# Patient Record
Sex: FEMALE | Race: WHITE | NOT HISPANIC OR LATINO
[De-identification: names, ages, dates, MRNs, and addresses within clinical notes are randomized per-mention and may not be internally consistent; named-entity substitution may affect disease eponyms.]

---

## 2017-01-05 ENCOUNTER — MEDICATION RENEWAL (OUTPATIENT)
Age: 72
End: 2017-01-05

## 2017-01-13 ENCOUNTER — APPOINTMENT (OUTPATIENT)
Dept: DERMATOLOGY | Facility: CLINIC | Age: 72
End: 2017-01-13

## 2017-01-26 ENCOUNTER — APPOINTMENT (OUTPATIENT)
Dept: DERMATOLOGY | Facility: CLINIC | Age: 72
End: 2017-01-26

## 2017-02-06 ENCOUNTER — LABORATORY RESULT (OUTPATIENT)
Age: 72
End: 2017-02-06

## 2017-02-10 LAB
ALBUMIN SERPL ELPH-MCNC: 4.1 G/DL
ALP BLD-CCNC: 109 U/L
ALT SERPL-CCNC: 27 U/L
ANION GAP SERPL CALC-SCNC: 16 MMOL/L
AST SERPL-CCNC: 24 U/L
BASOPHILS # BLD AUTO: 0.03 K/UL
BASOPHILS NFR BLD AUTO: 0.8 %
BILIRUB SERPL-MCNC: 0.7 MG/DL
BUN SERPL-MCNC: 17 MG/DL
CALCIUM SERPL-MCNC: 10.4 MG/DL
CHLORIDE SERPL-SCNC: 102 MMOL/L
CO2 SERPL-SCNC: 24 MMOL/L
CREAT SERPL-MCNC: 0.79 MG/DL
EOSINOPHIL # BLD AUTO: 0.25 K/UL
EOSINOPHIL NFR BLD AUTO: 6.3 %
GLUCOSE SERPL-MCNC: 108 MG/DL
HCT VFR BLD CALC: 46.5 %
HGB BLD-MCNC: 14.6 G/DL
IMM GRANULOCYTES NFR BLD AUTO: 0 %
LYMPHOCYTES # BLD AUTO: 1.26 K/UL
LYMPHOCYTES NFR BLD AUTO: 31.6 %
MAN DIFF?: NORMAL
MCHC RBC-ENTMCNC: 31.4 GM/DL
MCHC RBC-ENTMCNC: 33 PG
MCV RBC AUTO: 105 FL
MONOCYTES # BLD AUTO: 0.41 K/UL
MONOCYTES NFR BLD AUTO: 10.3 %
NEUTROPHILS # BLD AUTO: 2.04 K/UL
NEUTROPHILS NFR BLD AUTO: 51 %
PLATELET # BLD AUTO: 242 K/UL
POTASSIUM SERPL-SCNC: 4.4 MMOL/L
PROT SERPL-MCNC: 7.4 G/DL
RBC # BLD: 4.43 M/UL
RBC # FLD: 13.1 %
SODIUM SERPL-SCNC: 142 MMOL/L
WBC # FLD AUTO: 3.99 K/UL

## 2017-03-09 ENCOUNTER — APPOINTMENT (OUTPATIENT)
Dept: DERMATOLOGY | Facility: CLINIC | Age: 72
End: 2017-03-09

## 2017-03-09 VITALS
HEIGHT: 64 IN | DIASTOLIC BLOOD PRESSURE: 68 MMHG | WEIGHT: 164 LBS | BODY MASS INDEX: 28 KG/M2 | SYSTOLIC BLOOD PRESSURE: 130 MMHG

## 2017-03-21 ENCOUNTER — APPOINTMENT (OUTPATIENT)
Dept: INTERNAL MEDICINE | Facility: CLINIC | Age: 72
End: 2017-03-21

## 2017-03-21 VITALS
SYSTOLIC BLOOD PRESSURE: 124 MMHG | HEIGHT: 64 IN | BODY MASS INDEX: 29.02 KG/M2 | DIASTOLIC BLOOD PRESSURE: 80 MMHG | WEIGHT: 170 LBS

## 2017-06-01 ENCOUNTER — APPOINTMENT (OUTPATIENT)
Dept: DERMATOLOGY | Facility: CLINIC | Age: 72
End: 2017-06-01

## 2017-06-01 VITALS — SYSTOLIC BLOOD PRESSURE: 124 MMHG | DIASTOLIC BLOOD PRESSURE: 80 MMHG

## 2017-06-01 DIAGNOSIS — Z78.9 OTHER SPECIFIED HEALTH STATUS: ICD-10-CM

## 2017-08-16 ENCOUNTER — MEDICATION RENEWAL (OUTPATIENT)
Age: 72
End: 2017-08-16

## 2017-08-28 ENCOUNTER — RESULT CHARGE (OUTPATIENT)
Age: 72
End: 2017-08-28

## 2017-08-29 ENCOUNTER — APPOINTMENT (OUTPATIENT)
Dept: INTERNAL MEDICINE | Facility: CLINIC | Age: 72
End: 2017-08-29
Payer: MEDICARE

## 2017-08-29 ENCOUNTER — NON-APPOINTMENT (OUTPATIENT)
Age: 72
End: 2017-08-29

## 2017-08-29 VITALS
BODY MASS INDEX: 26.98 KG/M2 | SYSTOLIC BLOOD PRESSURE: 130 MMHG | HEIGHT: 64 IN | OXYGEN SATURATION: 98 % | DIASTOLIC BLOOD PRESSURE: 70 MMHG | HEART RATE: 72 BPM | WEIGHT: 158 LBS

## 2017-08-29 PROCEDURE — 99397 PER PM REEVAL EST PAT 65+ YR: CPT

## 2017-08-31 LAB
25(OH)D3 SERPL-MCNC: 46.3 NG/ML
ALBUMIN SERPL ELPH-MCNC: 4.4 G/DL
ALP BLD-CCNC: 94 U/L
ALT SERPL-CCNC: 31 U/L
ANION GAP SERPL CALC-SCNC: 17 MMOL/L
AST SERPL-CCNC: 45 U/L
BASOPHILS # BLD AUTO: 0.02 K/UL
BASOPHILS NFR BLD AUTO: 0.4 %
BILIRUB SERPL-MCNC: 0.6 MG/DL
BUN SERPL-MCNC: 21 MG/DL
CALCIUM SERPL-MCNC: 10.7 MG/DL
CHLORIDE SERPL-SCNC: 101 MMOL/L
CHOLEST SERPL-MCNC: 249 MG/DL
CHOLEST/HDLC SERPL: 1.6 RATIO
CO2 SERPL-SCNC: 24 MMOL/L
CREAT SERPL-MCNC: 1.12 MG/DL
EOSINOPHIL # BLD AUTO: 0.14 K/UL
EOSINOPHIL NFR BLD AUTO: 3.1 %
GLUCOSE SERPL-MCNC: 106 MG/DL
HCT VFR BLD CALC: 44.5 %
HDLC SERPL-MCNC: 152 MG/DL
HGB BLD-MCNC: 14.1 G/DL
IMM GRANULOCYTES NFR BLD AUTO: 0.2 %
LDLC SERPL CALC-MCNC: 73 MG/DL
LYMPHOCYTES # BLD AUTO: 0.79 K/UL
LYMPHOCYTES NFR BLD AUTO: 17.5 %
MAN DIFF?: NORMAL
MCHC RBC-ENTMCNC: 31.7 GM/DL
MCHC RBC-ENTMCNC: 32.9 PG
MCV RBC AUTO: 104 FL
MONOCYTES # BLD AUTO: 0.43 K/UL
MONOCYTES NFR BLD AUTO: 9.5 %
NEUTROPHILS # BLD AUTO: 3.12 K/UL
NEUTROPHILS NFR BLD AUTO: 69.3 %
PLATELET # BLD AUTO: 199 K/UL
POTASSIUM SERPL-SCNC: 4.6 MMOL/L
PROT SERPL-MCNC: 7.6 G/DL
RBC # BLD: 4.28 M/UL
RBC # FLD: 13.5 %
SODIUM SERPL-SCNC: 142 MMOL/L
TRIGL SERPL-MCNC: 119 MG/DL
TSH SERPL-ACNC: 0.95 UIU/ML
WBC # FLD AUTO: 4.51 K/UL

## 2017-09-14 ENCOUNTER — RX RENEWAL (OUTPATIENT)
Age: 72
End: 2017-09-14

## 2017-09-14 ENCOUNTER — TRANSCRIPTION ENCOUNTER (OUTPATIENT)
Age: 72
End: 2017-09-14

## 2017-09-18 ENCOUNTER — TRANSCRIPTION ENCOUNTER (OUTPATIENT)
Age: 72
End: 2017-09-18

## 2017-12-28 ENCOUNTER — APPOINTMENT (OUTPATIENT)
Dept: DERMATOLOGY | Facility: CLINIC | Age: 72
End: 2017-12-28
Payer: MEDICARE

## 2017-12-28 VITALS — SYSTOLIC BLOOD PRESSURE: 122 MMHG | DIASTOLIC BLOOD PRESSURE: 80 MMHG

## 2017-12-28 PROCEDURE — 99214 OFFICE O/P EST MOD 30 MIN: CPT | Mod: 25

## 2017-12-28 PROCEDURE — 17003 DESTRUCT PREMALG LES 2-14: CPT

## 2017-12-28 PROCEDURE — 17000 DESTRUCT PREMALG LESION: CPT

## 2018-02-22 ENCOUNTER — APPOINTMENT (OUTPATIENT)
Dept: DERMATOLOGY | Facility: CLINIC | Age: 73
End: 2018-02-22
Payer: MEDICARE

## 2018-02-22 PROCEDURE — 17000 DESTRUCT PREMALG LESION: CPT

## 2018-02-22 PROCEDURE — 99213 OFFICE O/P EST LOW 20 MIN: CPT | Mod: 25

## 2018-05-03 ENCOUNTER — APPOINTMENT (OUTPATIENT)
Dept: DERMATOLOGY | Facility: CLINIC | Age: 73
End: 2018-05-03
Payer: MEDICARE

## 2018-05-03 VITALS — DIASTOLIC BLOOD PRESSURE: 82 MMHG | SYSTOLIC BLOOD PRESSURE: 128 MMHG

## 2018-05-03 PROCEDURE — 99213 OFFICE O/P EST LOW 20 MIN: CPT

## 2018-05-14 ENCOUNTER — RX RENEWAL (OUTPATIENT)
Age: 73
End: 2018-05-14

## 2018-08-09 ENCOUNTER — APPOINTMENT (OUTPATIENT)
Dept: DERMATOLOGY | Facility: CLINIC | Age: 73
End: 2018-08-09
Payer: MEDICARE

## 2018-08-09 VITALS — SYSTOLIC BLOOD PRESSURE: 128 MMHG | DIASTOLIC BLOOD PRESSURE: 80 MMHG

## 2018-08-09 DIAGNOSIS — R22.9 LOCALIZED SWELLING, MASS AND LUMP, UNSPECIFIED: ICD-10-CM

## 2018-08-09 PROCEDURE — 17003 DESTRUCT PREMALG LES 2-14: CPT

## 2018-08-09 PROCEDURE — 99213 OFFICE O/P EST LOW 20 MIN: CPT | Mod: 25

## 2018-08-09 PROCEDURE — 17000 DESTRUCT PREMALG LESION: CPT

## 2018-09-06 ENCOUNTER — APPOINTMENT (OUTPATIENT)
Dept: INTERNAL MEDICINE | Facility: CLINIC | Age: 73
End: 2018-09-06

## 2018-10-11 ENCOUNTER — APPOINTMENT (OUTPATIENT)
Dept: DERMATOLOGY | Facility: CLINIC | Age: 73
End: 2018-10-11
Payer: MEDICARE

## 2018-10-11 VITALS — DIASTOLIC BLOOD PRESSURE: 74 MMHG | SYSTOLIC BLOOD PRESSURE: 124 MMHG

## 2018-10-11 PROCEDURE — 99214 OFFICE O/P EST MOD 30 MIN: CPT

## 2018-10-18 ENCOUNTER — APPOINTMENT (OUTPATIENT)
Dept: INTERNAL MEDICINE | Facility: CLINIC | Age: 73
End: 2018-10-18

## 2018-11-12 ENCOUNTER — RX RENEWAL (OUTPATIENT)
Age: 73
End: 2018-11-12

## 2018-11-19 ENCOUNTER — APPOINTMENT (OUTPATIENT)
Dept: INTERNAL MEDICINE | Facility: CLINIC | Age: 73
End: 2018-11-19
Payer: MEDICARE

## 2018-11-19 ENCOUNTER — NON-APPOINTMENT (OUTPATIENT)
Age: 73
End: 2018-11-19

## 2018-11-19 VITALS
OXYGEN SATURATION: 95 % | HEART RATE: 73 BPM | DIASTOLIC BLOOD PRESSURE: 80 MMHG | SYSTOLIC BLOOD PRESSURE: 120 MMHG | HEIGHT: 63 IN | BODY MASS INDEX: 29.23 KG/M2 | WEIGHT: 165 LBS

## 2018-11-19 DIAGNOSIS — R94.31 ABNORMAL ELECTROCARDIOGRAM [ECG] [EKG]: ICD-10-CM

## 2018-11-19 LAB
ALBUMIN SERPL ELPH-MCNC: 4.5 G/DL
ALP BLD-CCNC: 88 U/L
ALT SERPL-CCNC: 15 U/L
ANION GAP SERPL CALC-SCNC: 12 MMOL/L
AST SERPL-CCNC: 22 U/L
BASOPHILS # BLD AUTO: 0.03 K/UL
BASOPHILS NFR BLD AUTO: 0.6 %
BILIRUB SERPL-MCNC: 0.4 MG/DL
BUN SERPL-MCNC: 24 MG/DL
CALCIUM SERPL-MCNC: 10.8 MG/DL
CHLORIDE SERPL-SCNC: 105 MMOL/L
CHOLEST SERPL-MCNC: 198 MG/DL
CHOLEST/HDLC SERPL: 1.9 RATIO
CO2 SERPL-SCNC: 25 MMOL/L
CREAT SERPL-MCNC: 0.98 MG/DL
EOSINOPHIL # BLD AUTO: 0.15 K/UL
EOSINOPHIL NFR BLD AUTO: 3 %
GLUCOSE SERPL-MCNC: 107 MG/DL
HBA1C MFR BLD HPLC: 5.4 %
HCT VFR BLD CALC: 46.1 %
HCV AB SER QL: NONREACTIVE
HCV S/CO RATIO: 0.07 S/CO
HDLC SERPL-MCNC: 107 MG/DL
HGB BLD-MCNC: 15 G/DL
IMM GRANULOCYTES NFR BLD AUTO: 0.2 %
LDLC SERPL CALC-MCNC: 66 MG/DL
LYMPHOCYTES # BLD AUTO: 1.4 K/UL
LYMPHOCYTES NFR BLD AUTO: 28.2 %
MAN DIFF?: NORMAL
MCHC RBC-ENTMCNC: 32.5 GM/DL
MCHC RBC-ENTMCNC: 33.8 PG
MCV RBC AUTO: 103.8 FL
MONOCYTES # BLD AUTO: 0.48 K/UL
MONOCYTES NFR BLD AUTO: 9.7 %
NEUTROPHILS # BLD AUTO: 2.89 K/UL
NEUTROPHILS NFR BLD AUTO: 58.3 %
PLATELET # BLD AUTO: 219 K/UL
POTASSIUM SERPL-SCNC: 4.7 MMOL/L
PROT SERPL-MCNC: 7.5 G/DL
RBC # BLD: 4.44 M/UL
RBC # FLD: 13 %
SODIUM SERPL-SCNC: 142 MMOL/L
TRIGL SERPL-MCNC: 125 MG/DL
WBC # FLD AUTO: 4.96 K/UL

## 2018-11-19 PROCEDURE — 90472 IMMUNIZATION ADMIN EACH ADD: CPT

## 2018-11-19 PROCEDURE — G0008: CPT

## 2018-11-19 PROCEDURE — 93000 ELECTROCARDIOGRAM COMPLETE: CPT

## 2018-11-19 PROCEDURE — G0439: CPT | Mod: 25

## 2018-11-19 PROCEDURE — 90715 TDAP VACCINE 7 YRS/> IM: CPT

## 2018-11-19 PROCEDURE — 90662 IIV NO PRSV INCREASED AG IM: CPT

## 2018-11-19 NOTE — HISTORY OF PRESENT ILLNESS
[FreeTextEntry1] : Here for annual wellness visit [de-identified] : c/o insomnia: difficulty falling asleep AND staying asleep.  Hasn't slept well for many years but the last 2-3 months has gotten sig worse. \par Does not nap during the day nor fall asleep.  Does sometimes snore but never wakes up air hungry nor does she fall asleep during the day. Is currently using melatonin - started with 3 mg melatonin however didn’t help much.  now on 6 mg - falls asleep more easily but still wakes up Q 2 hours.  Often up for an hour before she can fall back asleep. Has only been on this higher dose a few days\par

## 2018-11-19 NOTE — PHYSICAL EXAM
[No Acute Distress] : no acute distress [Well-Appearing] : well-appearing [Normal Sclera/Conjunctiva] : normal sclera/conjunctiva [PERRL] : pupils equal round and reactive to light [EOMI] : extraocular movements intact [Normal Outer Ear/Nose] : the outer ears and nose were normal in appearance [Normal Oropharynx] : the oropharynx was normal [No JVD] : no jugular venous distention [No Lymphadenopathy] : no lymphadenopathy [No Respiratory Distress] : no respiratory distress  [Clear to Auscultation] : lungs were clear to auscultation bilaterally [Normal Rate] : normal rate  [Regular Rhythm] : with a regular rhythm [No Murmur] : no murmur heard [Pedal Pulses Present] : the pedal pulses are present [No Edema] : there was no peripheral edema [No Extremity Clubbing/Cyanosis] : no extremity clubbing/cyanosis [No Masses] : no palpable masses [No Nipple Discharge] : no nipple discharge [No Axillary Lymphadenopathy] : no axillary lymphadenopathy [Soft] : abdomen soft [Non Tender] : non-tender [Normal Bowel Sounds] : normal bowel sounds [Normal Supraclavicular Nodes] : no supraclavicular lymphadenopathy [Normal Axillary Nodes] : no axillary lymphadenopathy [Normal Posterior Cervical Nodes] : no posterior cervical lymphadenopathy [Normal Anterior Cervical Nodes] : no anterior cervical lymphadenopathy [No CVA Tenderness] : no CVA  tenderness [No Spinal Tenderness] : no spinal tenderness [Kyphosis] : kyphosis [No Joint Swelling] : no joint swelling [Grossly Normal Strength/Tone] : grossly normal strength/tone [Thin Hair] : thin hair [Normal] : normal texture and mobility [Normal Gait] : normal gait [Coordination Grossly Intact] : coordination grossly intact [Normal Affect] : the affect was normal [Normal Insight/Judgement] : insight and judgment were intact [de-identified] : no skin changes [de-identified] : chronic sun exposure - areas of erythema on face - sites where she is treating AKs [FreeTextEntry2] : ecchymosis approx 4 cm right upper lateral thigh

## 2018-11-19 NOTE — ADDENDUM
[FreeTextEntry1] : lab work from visit reviewed.  Adding iPTH.   Prior labs c/w primary hyperparathyroidism, rechecking.  Know, macrocytosis - onc is monitoring.

## 2018-11-19 NOTE — REVIEW OF SYSTEMS
[Postnasal Drip] : postnasal drip [Mole Changes] : mole changes [Insomnia] : insomnia [Negative] : Heme/Lymph [Depression] : no depression [de-identified] : freq derm visits - treatment for AK's

## 2018-11-19 NOTE — DISCUSSION/SUMMARY
[Subsequent Annual Wellness] : Subsequent Annual Wellness Visit [Improve Exercise Tolerance] : counseling was given on ways to improve exercise tolerance [Lipids Panel] : due for a lipid panel [Lipid Panel Freq.___(yrs)] : lipid panel is due every [unfilled] year(s) [Blood Glucose] : due for blood glucose [Risks and Benefits Discussed] : the risks and benefits of screening were discussed [Screening Not Indicated] : screening not indicated [Patient Declines] : the patient declines screening [Screening Current] : screening is current [Influenza Due Today] : influenza vaccine is due today [Risks & Benefits Discussed] : the risks and benefits of the Tdap vaccine were discussed with the patient [Tdap Vaccine Needed Today] : Tdap vaccine needed today [Complete and Up to Date] : complete and up to date [Endocrinology] : endocrinology [Follow-Up in ___] : follow-up visit needed in [unfilled] [FreeTextEntry4] : see note

## 2018-11-19 NOTE — HEALTH RISK ASSESSMENT
[Very Good] : ~his/her~ current health as very good [Good] : ~his/her~  mood as  good [No falls in past year] : Patient reported no falls in the past year [0] : 2) Feeling down, depressed, or hopeless: Not at all (0) [Patient reported mammogram was normal] : Patient reported mammogram was normal [HIV test declined] : HIV test declined [Hepatitis C test offered] : Hepatitis C test offered [Change in mental status noted] : Change in mental status noted [With Significant Other] : lives with significant other [] :  [Feels Safe at Home] : Feels safe at home [Fully functional (bathing, dressing, toileting, transferring, walking, feeding)] : Fully functional (bathing, dressing, toileting, transferring, walking, feeding) [Fully functional (using the telephone, shopping, preparing meals, housekeeping, doing laundry, using] : Fully functional and needs no help or supervision to perform IADLs (using the telephone, shopping, preparing meals, housekeeping, doing laundry, using transportation, managing medications and managing finances) [Smoke Detector] : smoke detector [Carbon Monoxide Detector] : carbon monoxide detector [Guns at Home] : guns at home [Seat Belt] :  uses seat belt [Sunscreen] : uses sunscreen [Discussed at today's visit] : Advance Directives Discussed at today's visit [Relationship: ___] : Relationship: [unfilled] [FreeTextEntry1] : Doesnt slleep as well as she used to [] : No [de-identified] : Derm - being treated for superficial skin cancer  [de-identified] : rare [de-identified] : tripped and fell in bathroom last week. nop head trauma of LOC. Bruise right upper thigh [Reports changes in hearing] : Reports no changes in hearing [Reports changes in vision] : Reports no changes in vision [MammogramDate] : 7/2018 [BoneDensityDate] : 7/2018 [BoneDensityComments] : results went to her onc - Dr Aretha Bertrand [ColonoscopyDate] : 2015 [ColonoscopyComments] : Dr Bauman, Dr. Yu took over.  [de-identified] : saw isidro last month - all ok [FreeTextEntry4] : Health Care Proxy form given - reviewed role of proxy and relevance including: Health Care Proxy allows you to appoint someone you trust  to make health care decisions for you if you lose the ability to make decisions yourself. By appointing a health care agent, you can ensure your wishes will be followed. You may allow your agent to make all health care decisions or only certain ones. The proxy form can also be used to document your wishes or instructions with regard to organ and/or tissue donation.\par Agree as patients physician to support expressed wishes.

## 2018-12-13 ENCOUNTER — APPOINTMENT (OUTPATIENT)
Dept: DERMATOLOGY | Facility: CLINIC | Age: 73
End: 2018-12-13
Payer: MEDICARE

## 2018-12-13 VITALS — DIASTOLIC BLOOD PRESSURE: 70 MMHG | SYSTOLIC BLOOD PRESSURE: 130 MMHG

## 2018-12-13 PROCEDURE — 99213 OFFICE O/P EST LOW 20 MIN: CPT

## 2019-03-06 ENCOUNTER — RX RENEWAL (OUTPATIENT)
Age: 74
End: 2019-03-06

## 2019-03-19 ENCOUNTER — APPOINTMENT (OUTPATIENT)
Dept: INTERNAL MEDICINE | Facility: CLINIC | Age: 74
End: 2019-03-19
Payer: MEDICARE

## 2019-03-19 VITALS
HEIGHT: 63 IN | SYSTOLIC BLOOD PRESSURE: 128 MMHG | HEART RATE: 76 BPM | OXYGEN SATURATION: 96 % | WEIGHT: 164 LBS | BODY MASS INDEX: 29.06 KG/M2 | DIASTOLIC BLOOD PRESSURE: 70 MMHG

## 2019-03-19 DIAGNOSIS — J30.9 ALLERGIC RHINITIS, UNSPECIFIED: ICD-10-CM

## 2019-03-19 PROCEDURE — 99214 OFFICE O/P EST MOD 30 MIN: CPT

## 2019-03-19 NOTE — REVIEW OF SYSTEMS
[Nasal Discharge] : nasal discharge [Mole Changes] : mole changes [Postnasal Drip] : postnasal drip [Insomnia] : insomnia [Negative] : Psychiatric [Hoarseness] : no hoarseness [Chest Pain] : no chest pain [Palpitations] : no palpitations [Lower Ext Edema] : no lower extremity edema [Depression] : no depression [FreeTextEntry4] : chronic allergic rhinitis [de-identified] : multiple derm visits - treatment for AK's.  currently having lesion on nose treated

## 2019-03-19 NOTE — HISTORY OF PRESENT ILLNESS
[Hyperlipidemia] : Hyperlipidemia [Hypertension] : Hypertension [Other: ___] : [unfilled]: [Does not check BP] : The patient is not checking blood pressure [<130/90] : Target blood pressure is  <130/90 [Doing Well] : Patient is doing well [Target goal met] : BP target goal met [Managed with medications] : managed with  medication [Low Intensity Therapy] : Patient is currently on low intensity statin  therapy [FreeTextEntry6] : derm visit, precancerous lesion on nose [FreeTextEntry1] : hypercalcemia - tries to remember to drink enough water.  Remembers most days. Usually 33 oz bottle of water daily.  used to drink twice that but then was up all night in the bathroom.  no sx of elevated calcium

## 2019-03-19 NOTE — PHYSICAL EXAM
[No Acute Distress] : no acute distress [Well-Appearing] : well-appearing [Normal Sclera/Conjunctiva] : normal sclera/conjunctiva [PERRL] : pupils equal round and reactive to light [EOMI] : extraocular movements intact [Normal Outer Ear/Nose] : the outer ears and nose were normal in appearance [No JVD] : no jugular venous distention [No Lymphadenopathy] : no lymphadenopathy [No Respiratory Distress] : no respiratory distress  [Clear to Auscultation] : lungs were clear to auscultation bilaterally [Normal Rate] : normal rate  [Regular Rhythm] : with a regular rhythm [No Murmur] : no murmur heard [Pedal Pulses Present] : the pedal pulses are present [No Edema] : there was no peripheral edema [No Extremity Clubbing/Cyanosis] : no extremity clubbing/cyanosis [Soft] : abdomen soft [Non Tender] : non-tender [Normal Bowel Sounds] : normal bowel sounds [Normal Anterior Cervical Nodes] : no anterior cervical lymphadenopathy [No CVA Tenderness] : no CVA  tenderness [No Spinal Tenderness] : no spinal tenderness [Kyphosis] : kyphosis [No Joint Swelling] : no joint swelling [Grossly Normal Strength/Tone] : grossly normal strength/tone [No Rash] : no rash [Thin Hair] : thin hair [Normal] : normal texture and mobility [Coordination Grossly Intact] : coordination grossly intact [No Focal Deficits] : no focal deficits [de-identified] : cobblestoning

## 2019-03-20 ENCOUNTER — TRANSCRIPTION ENCOUNTER (OUTPATIENT)
Age: 74
End: 2019-03-20

## 2019-03-20 LAB
BASOPHILS # BLD AUTO: 0.04 K/UL
BASOPHILS NFR BLD AUTO: 0.8 %
EOSINOPHIL # BLD AUTO: 0.27 K/UL
EOSINOPHIL NFR BLD AUTO: 5.6 %
HCT VFR BLD CALC: 46.7 %
HGB BLD-MCNC: 14.5 G/DL
IMM GRANULOCYTES NFR BLD AUTO: 0 %
LYMPHOCYTES # BLD AUTO: 1.36 K/UL
LYMPHOCYTES NFR BLD AUTO: 28.1 %
MAN DIFF?: NORMAL
MCHC RBC-ENTMCNC: 31 GM/DL
MCHC RBC-ENTMCNC: 33.1 PG
MCV RBC AUTO: 106.6 FL
MONOCYTES # BLD AUTO: 0.41 K/UL
MONOCYTES NFR BLD AUTO: 8.5 %
NEUTROPHILS # BLD AUTO: 2.76 K/UL
NEUTROPHILS NFR BLD AUTO: 57 %
PLATELET # BLD AUTO: 237 K/UL
RBC # BLD: 4.38 M/UL
RBC # FLD: 12.9 %
WBC # FLD AUTO: 4.84 K/UL

## 2019-03-29 ENCOUNTER — LABORATORY RESULT (OUTPATIENT)
Age: 74
End: 2019-03-29

## 2019-04-15 LAB
ANION GAP SERPL CALC-SCNC: 13 MMOL/L
BUN SERPL-MCNC: 27 MG/DL
CALCIUM SERPL-MCNC: 10 MG/DL
CHLORIDE SERPL-SCNC: 104 MMOL/L
CO2 SERPL-SCNC: 27 MMOL/L
CREAT SERPL-MCNC: 0.93 MG/DL
GLUCOSE SERPL-MCNC: 88 MG/DL
POTASSIUM SERPL-SCNC: 5 MMOL/L
SODIUM SERPL-SCNC: 144 MMOL/L

## 2019-05-23 ENCOUNTER — APPOINTMENT (OUTPATIENT)
Dept: INTERNAL MEDICINE | Facility: CLINIC | Age: 74
End: 2019-05-23
Payer: MEDICARE

## 2019-05-23 VITALS
HEART RATE: 91 BPM | WEIGHT: 165 LBS | HEIGHT: 63 IN | SYSTOLIC BLOOD PRESSURE: 122 MMHG | DIASTOLIC BLOOD PRESSURE: 70 MMHG | BODY MASS INDEX: 29.23 KG/M2 | OXYGEN SATURATION: 97 %

## 2019-05-23 DIAGNOSIS — N20.0 CALCULUS OF KIDNEY: ICD-10-CM

## 2019-05-23 PROCEDURE — 99215 OFFICE O/P EST HI 40 MIN: CPT

## 2019-05-23 RX ORDER — AMOXICILLIN 500 MG/1
500 CAPSULE ORAL
Qty: 16 | Refills: 0 | Status: DISCONTINUED | COMMUNITY
Start: 2017-07-01 | End: 2019-05-23

## 2019-05-23 RX ORDER — FLUOROURACIL 50 MG/G
5 CREAM TOPICAL
Qty: 1 | Refills: 0 | Status: DISCONTINUED | COMMUNITY
Start: 2017-12-28 | End: 2019-05-23

## 2019-05-28 NOTE — PHYSICAL EXAM
[No Acute Distress] : no acute distress [Well-Appearing] : well-appearing [Normal Sclera/Conjunctiva] : normal sclera/conjunctiva [PERRL] : pupils equal round and reactive to light [EOMI] : extraocular movements intact [Normal Oropharynx] : the oropharynx was normal [Supple] : supple [No Lymphadenopathy] : no lymphadenopathy [No Respiratory Distress] : no respiratory distress  [Clear to Auscultation] : lungs were clear to auscultation bilaterally [No Accessory Muscle Use] : no accessory muscle use [Normal Rate] : normal rate  [Regular Rhythm] : with a regular rhythm [Normal S1, S2] : normal S1 and S2 [No Murmur] : no murmur heard [No Edema] : there was no peripheral edema [Soft] : abdomen soft [Non Tender] : non-tender [Normal Bowel Sounds] : normal bowel sounds [Normal Supraclavicular Nodes] : no supraclavicular lymphadenopathy [Normal Posterior Cervical Nodes] : no posterior cervical lymphadenopathy [Normal Anterior Cervical Nodes] : no anterior cervical lymphadenopathy [No CVA Tenderness] : no CVA  tenderness [No Focal Deficits] : no focal deficits [Speech Grossly Normal] : speech grossly normal [Normal Affect] : the affect was normal [Alert and Oriented x3] : oriented to person, place, and time [Normal Mood] : the mood was normal [Normal Insight/Judgement] : insight and judgment were intact [de-identified] : normal turgor [de-identified] : No calf tenderness bilaterally. Radial pulses +2 bilaterally

## 2019-05-28 NOTE — ASSESSMENT
[High Risk Surgery - Intraperitoneal, Intrathoracic or Supringuinal Vascular Procedures] : High Risk Surgery - Intraperitoneal, Intrathoracic or Supringuinal Vascular Procedures - No (0) [Ischemic Heart Disease] : Ischemic Heart Disease - No (0) [Congestive Heart Failure] : Congestive Heart Failure - No (0) [Prior Cerebrovascular Accident or TIA] : Prior Cerebrovascular Accident or TIA - No (0) [Creatinine >= 2mg/dL (1 Point)] : Creatinine >= 2mg/dL - No (0) [Insulin-dependent Diabetic (1 Point)] : Insulin-dependent Diabetic - No (0) [0] : 0 , RCRI Class: I, Risk of Post-Op Cardiac Complications: 0.4%, Procedure Risk: Low-Risk [No Further Testing Recommended] : no further testing recommended [Modify medications prior to procedure] : Modify medications prior to procedure [As per surgery] : as per surgery [Patient Optimized for Surgery] : Patient optimized for surgery [FreeTextEntry4] : patient is a low risk candidate going for an intermediate risk procedure and nobody is zero risk and nothing is zero risk and this was discussed with the pt who expressed full understanding [FreeTextEntry7] : STOP Aleve, only tylenol prn pain, stop multivitamins/herbals 7 days prior to procedure all advised to the pt

## 2019-05-28 NOTE — PLAN
[FreeTextEntry1] : advised pt to f/u with the surgeon post -op and with us PRN and to do routine follouwp as planned with her PMD in July\par \par

## 2019-05-28 NOTE — RESULTS/DATA
[ECG Reviewed] : reviewed [No Acute Ischemia] : No acute ischemia [NSR] : normal sinus rhythm [P Waves Normal] : the P wave is normal [Normal QRS] : the QRS is normal [Normal ST Segments] : the ST segments are normal [No Interval Change] : no interval change [] : results reviewed [de-identified] : PST labs on 5/23/19 - CBC is unremarkable [de-identified] : PST labs on 5/23/19 showing Ca 11.0 (pt has known hypercalcemia which has ranged from 10.0 - 11.6) [de-identified] : PST Labs on 5/23/19 - coags are WNL [de-identified] : Labs from PST done on 5/23/19 also showed UA with blood and WBCs consistent with her known kidney stone

## 2019-05-28 NOTE — REVIEW OF SYSTEMS
[Fever] : no fever [Chills] : no chills [Pain] : no pain [Sore Throat] : no sore throat [Redness] : no redness [Chest Pain] : no chest pain [Leg Claudication] : no leg claudication [Palpitations] : no palpitations [Orthopnea] : no orthopnea [Lower Ext Edema] : no lower extremity edema [Shortness Of Breath] : no shortness of breath [Paroysmal Nocturnal Dyspnea] : no paroysmal nocturnal dyspnea [Cough] : no cough [Dyspnea on Exertion] : no dyspnea on exertion [Diarrhea] : diarrhea [Abdominal Pain] : no abdominal pain [Joint Swelling] : no joint swelling [Dysuria] : no dysuria [Hematuria] : no hematuria [Dizziness] : no dizziness [Skin Rash] : no skin rash [Fainting] : no fainting [Easy Bleeding] : no easy bleeding [Easy Bruising] : no easy bruising [de-identified] : pt denied a hx of easy bleeding or easy bruising in the past

## 2019-05-28 NOTE — HISTORY OF PRESENT ILLNESS
[(Patient denies any chest pain, claudication, dyspnea on exertion, orthopnea, palpitations or syncope)] : Patient denies any chest pain, claudication, dyspnea on exertion, orthopnea, palpitations or syncope [Moderate (4-6 METs)] : Moderate (4-6 METs) [Atrial Fibrillation] : no atrial fibrillation [Coronary Artery Disease] : no coronary artery disease [Aortic Stenosis] : no aortic stenosis [Recent Myocardial Infarction] : no recent myocardial infarction [Implantable Device/Pacemaker] : no implantable device/pacemaker [Asthma] : no asthma [Sleep Apnea] : no sleep apnea [COPD] : no COPD [Family Member] : no family member with adverse anesthesia reaction/sudden death [Self] : no previous adverse anesthesia reaction [Smoker] : not a smoker [Chronic Anticoagulation] : no chronic anticoagulation [Chronic Kidney Disease] : no chronic kidney disease [Diabetes] : no diabetes [FreeTextEntry1] : cystoscopy [FreeTextEntry2] : 5/29/19 [FreeTextEntry3] : Dr Meade Meyers [FreeTextEntry4] : \par Plan for cystoscopy to evaluate and possibly treat a right kidney stone which is new. No fevers or flank pains and no dysuria or hematuria. [FreeTextEntry7] : EKG - November 2018 that was unremarkable\par ECHO 7/21/16 - normal LV systolic function [FreeTextEntry8] : pt reports she can walk 4 blocks without CP or SOB or exertional symptoms

## 2019-06-04 ENCOUNTER — APPOINTMENT (OUTPATIENT)
Dept: ENDOCRINOLOGY | Facility: CLINIC | Age: 74
End: 2019-06-04

## 2019-06-04 ENCOUNTER — APPOINTMENT (OUTPATIENT)
Dept: ENDOCRINOLOGY | Facility: CLINIC | Age: 74
End: 2019-06-04
Payer: MEDICARE

## 2019-06-04 VITALS
BODY MASS INDEX: 29.41 KG/M2 | DIASTOLIC BLOOD PRESSURE: 70 MMHG | HEIGHT: 63 IN | SYSTOLIC BLOOD PRESSURE: 130 MMHG | OXYGEN SATURATION: 97 % | HEART RATE: 112 BPM | WEIGHT: 166 LBS

## 2019-06-04 DIAGNOSIS — Z87.39 PERSONAL HISTORY OF OTHER DISEASES OF THE MUSCULOSKELETAL SYSTEM AND CONNECTIVE TISSUE: ICD-10-CM

## 2019-06-04 PROCEDURE — 99205 OFFICE O/P NEW HI 60 MIN: CPT

## 2019-06-04 NOTE — ASSESSMENT
[FreeTextEntry1] : 74 year old female with  primary hyperparathyroidism, osteoporosis, renal stone. \par \par Pt had elevated Ca since at least 2016. These values have fluctuated over time. She had PTH:73 around this time. Pt was previously on hydrochlorothiazide and d/c due to elevated Ca.  She had kidney stones in situ for many years, removed in 2018 with stents placed. Pt has hx notable for early menopause at age 39 with HRT use for some time. Breast CA in 2012 with lumpectomy. On Anastrozole. BMD 2019 osteopenia in spine and hip and osteoporosis in proximal radius. \par \par Discussed implications of PHPT to include risk of kidney stones and osteoporosis. To correct elevated Ca, guidelines recommend parathyroidectomy of 1-4 glands. Can be observed without surgery if she was asymptomatic, however given her h/o kidney stones and osteoporosis she would be a candidate for surgery. Sensipar has been used in the setting of elevated serum Ca, however has not shown a significant reduction in kidney stones. I would not recommend this. Risks and benefits discussed. All questions were answered.\par \par I request labs sent out today to check Ca, PTH, Vit D. I request pt provide 24 hour urine collection. Recommend surgical consult with Dr. Leal or Dr Shea. Pt is reluctant to consider surgery but will discuss with  and her other physicians. \par \par f/u TBD. \par Scottie MARANDA, Yina ML, Elia R, et al. Guidelines for the management of asymptomatic primary hyperparathyroidism: summary statement from the Fourth International Workshop. J Clin Endocrinol Metab 2014; 99:3561

## 2019-06-04 NOTE — PHYSICAL EXAM
[Alert] : alert [No Acute Distress] : no acute distress [Well Nourished] : well nourished [Well Developed] : well developed [EOMI] : extra ocular movement intact [Normal Sclera/Conjunctiva] : normal sclera/conjunctiva [No Proptosis] : no proptosis [Normal Oropharynx] : the oropharynx was normal [Thyroid Not Enlarged] : the thyroid was not enlarged [No Respiratory Distress] : no respiratory distress [No Thyroid Nodules] : there were no palpable thyroid nodules [No Accessory Muscle Use] : no accessory muscle use [Clear to Auscultation] : lungs were clear to auscultation bilaterally [Normal Rate] : heart rate was normal  [Regular Rhythm] : with a regular rhythm [Normal S1, S2] : normal S1 and S2 [Normal Bowel Sounds] : normal bowel sounds [Soft] : abdomen soft [Not Tender] : non-tender [Post Cervical Nodes] : posterior cervical nodes [Not Distended] : not distended [Anterior Cervical Nodes] : anterior cervical nodes [Normal] : normal and non tender [Axillary Nodes] : axillary nodes [Spine Straight] : spine straight [No Spinal Tenderness] : no spinal tenderness [Normal Gait] : normal gait [No Stigmata of Cushings Syndrome] : no stigmata of cushings syndrome [Normal Strength/Tone] : muscle strength and tone were normal [No Rash] : no rash [No Tremors] : no tremors [Normal Reflexes] : deep tendon reflexes were 2+ and symmetric [Oriented x3] : oriented to person, place, and time [Kyphosis] : kyphosis present [Acanthosis Nigricans] : no acanthosis nigricans

## 2019-06-04 NOTE — HISTORY OF PRESENT ILLNESS
[Estrogens (HRT)] : HRT [Calcium (dietary)] : calcium from their regular diet [Kidney Stones] : a history of kidney stones [Hyperparathyroidism] : hyperparathyroidism [Regular Dental Follow-Up] : regular dental follow-up [FreeTextEntry1] : Ms. MENDEZ is a 74 year old female being referred today for presumable PHPT. \par \par Pt a kidney stone removed in 2018, which had been in situ for many years along with another smaller one. Pt will return to urology to have stent removed. No 24 hour Ca done. Pt put on potassium citrate. \par \par Pt has had elevated Ca since at least Oct 2016 Ca: 11.6. PTH: 73 with Ca:10.6. March 2019 Ca:10 normal. May 2019 Ca:11. Pt previously on hydrochlorothiazide and was taken off due to elevated Ca. No h/o lithium use. No FHx of Ca disorders or MENS. \par \par Breast CA 2012 with lumpectomy. Currently on Anastrozole since 2012. Sees Dr. Bertrand. \par \par BMD Jan 2019 spine:-1.7  tot hip:-1.3 fem neck:-1.7 didn't included proximal radius (site often most affected by PHPT). She had early menopause at age 39 and was on HRT. Prior clavicle fxs from falling down stairs. Pt does not formally exercise but is active.  [High Fall Risk] : no fall risk [Frequent Falls] : no frequent falls [Uses a Walker/Cane] : no use of a walker/cane

## 2019-06-04 NOTE — END OF VISIT
[FreeTextEntry3] : I, Neida Bazan, authored this note working as a medical scribe for Dr. Acosta.  06/04/2019.  1:00PM.  This note was authored by Neida Bazan working as medical scribe for me. I have reviewed, edited, and revised the note as needed. I am in agreement with the exam findings, imaging findings, and treatment plan.  Ronal Acosta MD

## 2019-06-04 NOTE — CONSULT LETTER
[Consult Letter:] : I had the pleasure of evaluating your patient, [unfilled]. [Please see my note below.] : Please see my note below. [Consult Closing:] : Thank you very much for allowing me to participate in the care of this patient.  If you have any questions, please do not hesitate to contact me. [Sincerely,] : Sincerely, [Dear  ___] : Dear  [unfilled], [FreeTextEntry2] : Christie Goncalves MD [DrMelly  ___] : Dr. ALVARADO

## 2019-06-05 LAB
24R-OH-CALCIDIOL SERPL-MCNC: 46.2 PG/ML
25(OH)D3 SERPL-MCNC: 38.2 NG/ML
ALBUMIN SERPL ELPH-MCNC: 4.3 G/DL
ALP BLD-CCNC: 94 U/L
ALT SERPL-CCNC: 16 U/L
ANION GAP SERPL CALC-SCNC: 14 MMOL/L
AST SERPL-CCNC: 20 U/L
BILIRUB SERPL-MCNC: 0.6 MG/DL
BUN SERPL-MCNC: 28 MG/DL
CALCIUM SERPL-MCNC: 11 MG/DL
CALCIUM SERPL-MCNC: 11 MG/DL
CHLORIDE SERPL-SCNC: 103 MMOL/L
CO2 SERPL-SCNC: 25 MMOL/L
CREAT SERPL-MCNC: 0.96 MG/DL
GLUCOSE SERPL-MCNC: 122 MG/DL
PARATHYROID HORMONE INTACT: 81 PG/ML
PHOSPHATE SERPL-MCNC: 2.8 MG/DL
POTASSIUM SERPL-SCNC: 5.1 MMOL/L
PROT SERPL-MCNC: 7.1 G/DL
SODIUM SERPL-SCNC: 142 MMOL/L

## 2019-06-24 ENCOUNTER — APPOINTMENT (OUTPATIENT)
Dept: SURGERY | Facility: CLINIC | Age: 74
End: 2019-06-24
Payer: MEDICARE

## 2019-06-24 VITALS
SYSTOLIC BLOOD PRESSURE: 126 MMHG | HEART RATE: 86 BPM | BODY MASS INDEX: 28.17 KG/M2 | DIASTOLIC BLOOD PRESSURE: 79 MMHG | WEIGHT: 165 LBS | HEIGHT: 64 IN

## 2019-06-24 PROCEDURE — 99204 OFFICE O/P NEW MOD 45 MIN: CPT

## 2019-06-24 NOTE — HISTORY OF PRESENT ILLNESS
[de-identified] : Patient referred by Dr. Acosta for evaluation of primary hyperparathyroidism. Patient was diagnosed with elevated calcium 3 years ago. Reports history of kidney stones, hypertension, osteoporosis, reflux, increased thirst, fatigue, occasional dysphagia and radiation treatment for breast cancer 7 years ago. Calcium 11, PTH 81, vitamin D 38. Bone density wrist T. -3.6.

## 2019-06-24 NOTE — CONSULT LETTER
Preventive Health Recommendations  Male Ages 26 - 39    Yearly exam:             See your health care provider every year in order to  o   Review health changes.   o   Discuss preventive care.    o   Review your medicines if your doctor has prescribed any.    You should be tested each year for STDs (sexually transmitted diseases), if you re at risk.     After age 35, talk to your provider about cholesterol testing. If you are at risk for heart disease, have your cholesterol tested at least every 5 years.     If you are at risk for diabetes, you should have a diabetes test (fasting glucose).  Shots: Get a flu shot each year. Get a tetanus shot every 10 years.     Nutrition:    Eat at least 5 servings of fruits and vegetables daily.     Eat whole-grain bread, whole-wheat pasta and brown rice instead of white grains and rice.     Talk to your provider about Calcium and Vitamin D.     Lifestyle    Exercise for at least 150 minutes a week (30 minutes a day, 5 days a week). This will help you control your weight and prevent disease.     Limit alcohol to one drink per day.     No smoking.     Wear sunscreen to prevent skin cancer.     See your dentist every six months for an exam and cleaning.       Keep working on healthy diet/exercise     Advise scheduling upper endoscopy EGD    Schedule with dentist   [Dear  ___] : Dear  [unfilled], [Consult Letter:] : I had the pleasure of evaluating your patient, [unfilled]. [Consult Closing:] : Thank you very much for allowing me to participate in the care of this patient.  If you have any questions, please do not hesitate to contact me. [Please see my note below.] : Please see my note below. [FreeTextEntry3] : Sincerely yours,\par \par Emma Castillo MD, FACS\par Assistant Professor of Surgery\par [facility] [DrMelly  ___] : Dr. ALVARADO [FreeTextEntry2] : Dr. Ronal Acosta

## 2019-06-24 NOTE — REASON FOR VISIT
[Initial Consultation] : an initial consultation for [FreeTextEntry2] : primary hyperparathyroidism [Other: _____] : [unfilled]

## 2019-06-24 NOTE — ASSESSMENT
[FreeTextEntry1] : Patient with primary hyperparathyroidism with kidney stones and osteoporosis. I recommended surgical intervention. I have requested a 4D CT scan to assist in preoperative localization.I have discussed the risks, benefits and alternative treatments which include but are not limited to bleeding, infection, numbness, hoarseness, hypocalcemia, scarring, and need for reoperation. I have answered the patient's questions. They will contact my office to schedule surgery.

## 2019-06-24 NOTE — PHYSICAL EXAM
[de-identified] : no cervical or supraclavicular adenopathy, trachea midline, thyroid without enlargement or palpable mass [Normal] : no neck adenopathy

## 2019-06-28 ENCOUNTER — FORM ENCOUNTER (OUTPATIENT)
Age: 74
End: 2019-06-28

## 2019-06-29 ENCOUNTER — APPOINTMENT (OUTPATIENT)
Dept: CT IMAGING | Facility: IMAGING CENTER | Age: 74
End: 2019-06-29
Payer: MEDICARE

## 2019-06-29 ENCOUNTER — OUTPATIENT (OUTPATIENT)
Dept: OUTPATIENT SERVICES | Facility: HOSPITAL | Age: 74
LOS: 1 days | End: 2019-06-29
Payer: MEDICARE

## 2019-06-29 DIAGNOSIS — Z98.89 OTHER SPECIFIED POSTPROCEDURAL STATES: Chronic | ICD-10-CM

## 2019-06-29 DIAGNOSIS — E21.0 PRIMARY HYPERPARATHYROIDISM: ICD-10-CM

## 2019-06-29 PROCEDURE — 70491 CT SOFT TISSUE NECK W/DYE: CPT | Mod: 26

## 2019-06-29 PROCEDURE — 70492 CT SFT TSUE NCK W/O & W/DYE: CPT

## 2019-07-05 ENCOUNTER — OUTPATIENT (OUTPATIENT)
Dept: OUTPATIENT SERVICES | Facility: HOSPITAL | Age: 74
LOS: 1 days | End: 2019-07-05
Payer: MEDICARE

## 2019-07-05 VITALS
SYSTOLIC BLOOD PRESSURE: 118 MMHG | OXYGEN SATURATION: 99 % | HEIGHT: 62 IN | HEART RATE: 82 BPM | WEIGHT: 162.92 LBS | TEMPERATURE: 99 F | DIASTOLIC BLOOD PRESSURE: 60 MMHG | RESPIRATION RATE: 16 BRPM

## 2019-07-05 DIAGNOSIS — E21.0 PRIMARY HYPERPARATHYROIDISM: ICD-10-CM

## 2019-07-05 DIAGNOSIS — Z98.89 OTHER SPECIFIED POSTPROCEDURAL STATES: Chronic | ICD-10-CM

## 2019-07-05 DIAGNOSIS — N20.0 CALCULUS OF KIDNEY: Chronic | ICD-10-CM

## 2019-07-05 DIAGNOSIS — Z98.890 OTHER SPECIFIED POSTPROCEDURAL STATES: Chronic | ICD-10-CM

## 2019-07-05 LAB
ANION GAP SERPL CALC-SCNC: 16 MMO/L — HIGH (ref 7–14)
BUN SERPL-MCNC: 28 MG/DL — HIGH (ref 7–23)
CALCIUM SERPL-MCNC: 10.5 MG/DL — SIGNIFICANT CHANGE UP (ref 8.4–10.5)
CHLORIDE SERPL-SCNC: 106 MMOL/L — SIGNIFICANT CHANGE UP (ref 98–107)
CO2 SERPL-SCNC: 23 MMOL/L — SIGNIFICANT CHANGE UP (ref 22–31)
CREAT SERPL-MCNC: 0.84 MG/DL — SIGNIFICANT CHANGE UP (ref 0.5–1.3)
GLUCOSE SERPL-MCNC: 55 MG/DL — LOW (ref 70–99)
HCT VFR BLD CALC: 45.6 % — HIGH (ref 34.5–45)
HGB BLD-MCNC: 14.5 G/DL — SIGNIFICANT CHANGE UP (ref 11.5–15.5)
MCHC RBC-ENTMCNC: 31.8 % — LOW (ref 32–36)
MCHC RBC-ENTMCNC: 33.1 PG — SIGNIFICANT CHANGE UP (ref 27–34)
MCV RBC AUTO: 104.1 FL — HIGH (ref 80–100)
NRBC # FLD: 0 K/UL — SIGNIFICANT CHANGE UP (ref 0–0)
PLATELET # BLD AUTO: 222 K/UL — SIGNIFICANT CHANGE UP (ref 150–400)
PMV BLD: 9.7 FL — SIGNIFICANT CHANGE UP (ref 7–13)
POTASSIUM SERPL-MCNC: 3.6 MMOL/L — SIGNIFICANT CHANGE UP (ref 3.5–5.3)
POTASSIUM SERPL-SCNC: 3.6 MMOL/L — SIGNIFICANT CHANGE UP (ref 3.5–5.3)
RBC # BLD: 4.38 M/UL — SIGNIFICANT CHANGE UP (ref 3.8–5.2)
RBC # FLD: 12.5 % — SIGNIFICANT CHANGE UP (ref 10.3–14.5)
SODIUM SERPL-SCNC: 145 MMOL/L — SIGNIFICANT CHANGE UP (ref 135–145)
WBC # BLD: 3.9 K/UL — SIGNIFICANT CHANGE UP (ref 3.8–10.5)
WBC # FLD AUTO: 3.9 K/UL — SIGNIFICANT CHANGE UP (ref 3.8–10.5)

## 2019-07-05 PROCEDURE — 93010 ELECTROCARDIOGRAM REPORT: CPT

## 2019-07-05 NOTE — H&P PST ADULT - NEGATIVE GENERAL GENITOURINARY SYMPTOMS
no hematuria/no urine discoloration/no bladder infections/no flank pain L/no flank pain R/no dysuria

## 2019-07-05 NOTE — H&P PST ADULT - NEGATIVE OPHTHALMOLOGIC SYMPTOMS
no blurred vision L/no pain R/no loss of vision L/no lacrimation L/no lacrimation R/no discharge R/no irritation L/no loss of vision R/no diplopia/no photophobia/no blurred vision R/no discharge L/no pain L/no irritation R no blurred vision R/no pain L/no lacrimation L/no lacrimation R/no discharge R/no loss of vision L/no loss of vision R/no scleral injection R/no pain R/no irritation L/no scleral injection L/no diplopia/no photophobia/no blurred vision L/no discharge L/no irritation R

## 2019-07-05 NOTE — H&P PST ADULT - NSICDXPROBLEM_GEN_ALL_CORE_FT
PROBLEM DIAGNOSES  Problem: Primary hyperparathyroidism  Assessment and Plan: Scheduled for parathyroidectomy with parathyroid hormone assay on 07/16/19. Pre op instructions, chlorhexidine gluconate soap given and explained. Pt verbalized understanding.   Pending medical clearance as per surgeon's request  Pt refused famotidine

## 2019-07-05 NOTE — H&P PST ADULT - RS GEN PE MLT RESP DETAILS PC
clear to auscultation bilaterally/no chest wall tenderness/breath sounds equal/no intercostal retractions/respirations non-labored/good air movement/no rhonchi/no wheezes/no rales

## 2019-07-05 NOTE — H&P PST ADULT - NEGATIVE CARDIOVASCULAR SYMPTOMS
no paroxysmal nocturnal dyspnea/no palpitations/no orthopnea/no dyspnea on exertion/no chest pain/no claudication/no peripheral edema

## 2019-07-05 NOTE — H&P PST ADULT - NSICDXPASTMEDICALHX_GEN_ALL_CORE_FT
PAST MEDICAL HISTORY:  Basal cell carcinoma Nose 2009    Essential hypertension     Hallux valgus of left foot     Hyperlipidemia, unspecified hyperlipidemia type     Malignant neoplasm of right female breast, unspecified site of breast PAST MEDICAL HISTORY:  Basal cell carcinoma Nose 2009    Essential hypertension     Hallux valgus of left foot     History of radiation therapy 2013    Hyperlipidemia, unspecified hyperlipidemia type     Malignant neoplasm of right female breast, unspecified site of breast

## 2019-07-05 NOTE — H&P PST ADULT - NSICDXPASTSURGICALHX_GEN_ALL_CORE_FT
PAST SURGICAL HISTORY:  H/O lumpectomy Right breast s/p Lumpectomy and radiation    History of bunionectomy of left great toe 10/2016    Right kidney stone remove ; 05/2019    S/P Mohs surgery for basal cell carcinoma Jan 2009

## 2019-07-05 NOTE — H&P PST ADULT - HISTORY OF PRESENT ILLNESS
73 y/o  female with PMH: HTN, Dyslipidemia 75 y/o  female with PMH: HTN, Dyslipidemia, Breast caner - S/P right breast lumpectomy (11/2012) presents to PST for pre op evaluation with hx of elevated calcium levels for " a couple of years". Pt stated that she had been following up with PCP who referred her to endocrinologist. Pre op diagnosis: primary hyperparathyroidism. Now scheduled for parathyroidectomy with parathyroid hormone assay on 07/16/19

## 2019-07-08 PROBLEM — Z92.3 PERSONAL HISTORY OF IRRADIATION: Chronic | Status: ACTIVE | Noted: 2019-07-05

## 2019-07-09 ENCOUNTER — APPOINTMENT (OUTPATIENT)
Dept: INTERNAL MEDICINE | Facility: CLINIC | Age: 74
End: 2019-07-09
Payer: MEDICARE

## 2019-07-09 VITALS
BODY MASS INDEX: 28.32 KG/M2 | SYSTOLIC BLOOD PRESSURE: 132 MMHG | OXYGEN SATURATION: 96 % | HEART RATE: 107 BPM | WEIGHT: 165 LBS | DIASTOLIC BLOOD PRESSURE: 70 MMHG

## 2019-07-09 PROCEDURE — 99215 OFFICE O/P EST HI 40 MIN: CPT

## 2019-07-09 NOTE — RESULTS/DATA
[No Acute Ischemia] : No acute ischemia [] : results reviewed [LVH] : left ventricular hypertrophy [No Interval Change] : no interval change

## 2019-07-11 NOTE — HISTORY OF PRESENT ILLNESS
[No Pertinent Cardiac History] : no history of aortic stenosis, atrial fibrillation, coronary artery disease, recent myocardial infarction, or implantable device/pacemaker [Asthma] : no asthma [Sleep Apnea] : no sleep apnea [Smoker] : not a smoker [Chronic Anticoagulation] : no chronic anticoagulation [Diabetes] : no diabetes [(Patient denies any chest pain, claudication, dyspnea on exertion, orthopnea, palpitations or syncope)] : Patient denies any chest pain, claudication, dyspnea on exertion, orthopnea, palpitations or syncope [____ METs%] : [unfilled] METs% [FreeTextEntry1] : parathyroidectomy [FreeTextEntry2] : 7/16/19 [Good] : Good [Fever] : no fever [Chills] : no chills [Fatigue] : no fatigue [Cough] : no cough [Dysuria] : no dysuria [Urinary Frequency] : no urinary frequency [Nausea] : no nausea [Vomiting] : no vomiting [Diarrhea] : no diarrhea [Abdominal Pain] : no abdominal pain [Lower Extremity Swelling] : no lower extremity swelling

## 2019-07-11 NOTE — ASSESSMENT
[High Risk Surgery - Intraperitoneal, Intrathoracic or Supringuinal Vascular Procedures] : High Risk Surgery - Intraperitoneal, Intrathoracic or Supringuinal Vascular Procedures - No (0) [Ischemic Heart Disease] : Ischemic Heart Disease - No (0) [Congestive Heart Failure] : Congestive Heart Failure - No (0) [Prior Cerebrovascular Accident or TIA] : Prior Cerebrovascular Accident or TIA - No (0) [Creatinine >= 2mg/dL (1 Point)] : Creatinine >= 2mg/dL - No (0) [Insulin-dependent Diabetic (1 Point)] : Insulin-dependent Diabetic - No (0) [0] : 0 , RCRI Class: I, Risk of Post-Op Cardiac Complications: 0.4%, Procedure Risk: Low-Risk [Patient Optimized for Surgery] : Patient optimized for surgery [No Further Testing Recommended] : no further testing recommended [As per surgery] : as per surgery [FreeTextEntry4] : 73 yo female for parathyroidectomy [FreeTextEntry7] : Can take amlodipine the morning of her surgery.  Hold Vit 1 week prior.  No ASA/NSAIDs one week prior.  OK to use tylenol prn during this time

## 2019-07-15 ENCOUNTER — APPOINTMENT (OUTPATIENT)
Dept: DERMATOLOGY | Facility: CLINIC | Age: 74
End: 2019-07-15
Payer: MEDICARE

## 2019-07-15 ENCOUNTER — TRANSCRIPTION ENCOUNTER (OUTPATIENT)
Age: 74
End: 2019-07-15

## 2019-07-15 DIAGNOSIS — M67.442 GANGLION, LEFT HAND: ICD-10-CM

## 2019-07-15 DIAGNOSIS — R22.9 LOCALIZED SWELLING, MASS AND LUMP, UNSPECIFIED: ICD-10-CM

## 2019-07-15 PROCEDURE — 99214 OFFICE O/P EST MOD 30 MIN: CPT

## 2019-07-16 ENCOUNTER — OUTPATIENT (OUTPATIENT)
Dept: OUTPATIENT SERVICES | Facility: HOSPITAL | Age: 74
LOS: 1 days | Discharge: HOME | End: 2019-07-16
Payer: MEDICARE

## 2019-07-16 ENCOUNTER — APPOINTMENT (OUTPATIENT)
Dept: SURGERY | Facility: HOSPITAL | Age: 74
End: 2019-07-16

## 2019-07-16 ENCOUNTER — RESULT REVIEW (OUTPATIENT)
Age: 74
End: 2019-07-16

## 2019-07-16 VITALS
DIASTOLIC BLOOD PRESSURE: 63 MMHG | OXYGEN SATURATION: 98 % | HEART RATE: 78 BPM | RESPIRATION RATE: 16 BRPM | SYSTOLIC BLOOD PRESSURE: 142 MMHG

## 2019-07-16 VITALS
WEIGHT: 162.92 LBS | DIASTOLIC BLOOD PRESSURE: 58 MMHG | OXYGEN SATURATION: 98 % | HEART RATE: 79 BPM | HEIGHT: 62 IN | TEMPERATURE: 98 F | RESPIRATION RATE: 18 BRPM | SYSTOLIC BLOOD PRESSURE: 157 MMHG

## 2019-07-16 DIAGNOSIS — N20.0 CALCULUS OF KIDNEY: Chronic | ICD-10-CM

## 2019-07-16 DIAGNOSIS — Z98.89 OTHER SPECIFIED POSTPROCEDURAL STATES: Chronic | ICD-10-CM

## 2019-07-16 DIAGNOSIS — Z98.890 OTHER SPECIFIED POSTPROCEDURAL STATES: Chronic | ICD-10-CM

## 2019-07-16 DIAGNOSIS — E21.0 PRIMARY HYPERPARATHYROIDISM: ICD-10-CM

## 2019-07-16 PROCEDURE — 88331 PATH CONSLTJ SURG 1 BLK 1SPC: CPT | Mod: 26

## 2019-07-16 PROCEDURE — 60210 PARTIAL THYROID EXCISION: CPT | Mod: 59

## 2019-07-16 PROCEDURE — 88305 TISSUE EXAM BY PATHOLOGIST: CPT | Mod: 26

## 2019-07-16 PROCEDURE — 60500 EXPLORE PARATHYROID GLANDS: CPT

## 2019-07-16 RX ORDER — VITAMIN E 100 UNIT
1 CAPSULE ORAL
Qty: 0 | Refills: 0 | DISCHARGE

## 2019-07-16 RX ORDER — ASCORBIC ACID 60 MG
1 TABLET,CHEWABLE ORAL
Qty: 0 | Refills: 0 | DISCHARGE

## 2019-07-16 RX ORDER — POTASSIUM CITRATE MONOHYDRATE 100 %
1 POWDER (GRAM) MISCELLANEOUS
Qty: 0 | Refills: 0 | DISCHARGE

## 2019-07-16 RX ORDER — AMLODIPINE BESYLATE 2.5 MG/1
1 TABLET ORAL
Qty: 0 | Refills: 0 | DISCHARGE

## 2019-07-16 RX ORDER — ANASTROZOLE 1 MG/1
1 TABLET ORAL
Qty: 0 | Refills: 0 | DISCHARGE

## 2019-07-16 RX ORDER — SIMVASTATIN 20 MG/1
1 TABLET, FILM COATED ORAL
Qty: 0 | Refills: 0 | DISCHARGE

## 2019-07-16 RX ORDER — BENZOCAINE AND MENTHOL 5; 1 G/100ML; G/100ML
1 LIQUID ORAL
Refills: 0 | Status: DISCONTINUED | OUTPATIENT
Start: 2019-07-16 | End: 2019-07-16

## 2019-07-16 RX ORDER — SODIUM CHLORIDE 9 MG/ML
1000 INJECTION, SOLUTION INTRAVENOUS
Refills: 0 | Status: DISCONTINUED | OUTPATIENT
Start: 2019-07-16 | End: 2019-07-16

## 2019-07-16 RX ORDER — ACETAMINOPHEN 500 MG
650 TABLET ORAL EVERY 6 HOURS
Refills: 0 | Status: DISCONTINUED | OUTPATIENT
Start: 2019-07-16 | End: 2019-07-31

## 2019-07-16 RX ADMIN — Medication 2 TABLET(S): at 15:46

## 2019-07-16 NOTE — BRIEF OPERATIVE NOTE - NSICDXBRIEFPROCEDURE_GEN_ALL_CORE_FT
PROCEDURES:  Parathyroidectomy 16-Jul-2019 14:27:39  Maame Fuentes  Partial thyroid lobectomy 16-Jul-2019 14:27:20  Maame Fuentes

## 2019-07-16 NOTE — ASU PREOP CHECKLIST - SIDE RAILS UP
no loss of consciousness, no gait abnormality, no headache, no sensory deficits, and no weakness.
n/a

## 2019-07-16 NOTE — ASU DISCHARGE PLAN (ADULT/PEDIATRIC) - ASU DC SPECIAL INSTRUCTIONSFT
Begin taking Calcium Carbonate as instructed. First dose was given in the hospital after the operation. Begin taking Calcium Carbonate as instructed. First dose was given in the hospital after the operation.  call for time of appt for 7/22/19

## 2019-07-16 NOTE — ASU DISCHARGE PLAN (ADULT/PEDIATRIC) - CALL YOUR DOCTOR IF YOU HAVE ANY OF THE FOLLOWING:
Swelling that gets worse/Bleeding that does not stop Swelling that gets worse/Nausea and vomiting that does not stop/Inability to tolerate liquids or foods/Unable to urinate/Pain not relieved by Medications/Bleeding that does not stop/Wound/Surgical Site with redness, or foul smelling discharge or pus

## 2019-07-16 NOTE — ASU DISCHARGE PLAN (ADULT/PEDIATRIC) - PAIN MANAGEMENT
Prescription given to patient/guardian/Take over the counter pain medication Prescription given to patient/guardian/Take over the counter pain medication/Tylenol

## 2019-07-16 NOTE — ASU PATIENT PROFILE, ADULT - PMH
Basal cell carcinoma  Nose 2009  Essential hypertension    Hallux valgus of left foot    History of radiation therapy  2013  Hyperlipidemia, unspecified hyperlipidemia type    Malignant neoplasm of right female breast, unspecified site of breast

## 2019-07-16 NOTE — ASU DISCHARGE PLAN (ADULT/PEDIATRIC) - MEDICATION INSTRUCTIONS
Tylenol and Advil for pain. Tylenol You received IV Tylenol for pain management at 2:05 PM. Please DO NOT take any Tylenol (Acetaminophen) containing products, such as Vicodin, Percocet, Excedrin, and cold medications for the next 6 hours (until 8:05 PM). DO NOT TAKE MORE THAN 3000 MG OF TYLENOL in a 24 hour period.

## 2019-07-16 NOTE — ASU DISCHARGE PLAN (ADULT/PEDIATRIC) - CARE PROVIDER_API CALL
Emma Castillo)  Surgery  [address] [facility], [address]  [place] [address]  Phone: [phone]  Fax: [phone]  Follow Up Time:

## 2019-07-16 NOTE — ASU PATIENT PROFILE, ADULT - PSH
H/O lumpectomy  Right breast s/p Lumpectomy and radiation  History of bunionectomy of left great toe  10/2016  Right kidney stone  remove ; 05/2019  S/P Mohs surgery for basal cell carcinoma  Jan 2009

## 2019-07-19 LAB — SURGICAL PATHOLOGY STUDY: SIGNIFICANT CHANGE UP

## 2019-07-22 ENCOUNTER — APPOINTMENT (OUTPATIENT)
Dept: SURGERY | Facility: CLINIC | Age: 74
End: 2019-07-22
Payer: MEDICARE

## 2019-07-22 PROCEDURE — 99024 POSTOP FOLLOW-UP VISIT: CPT

## 2019-07-22 PROCEDURE — 36415 COLL VENOUS BLD VENIPUNCTURE: CPT

## 2019-07-22 NOTE — ASSESSMENT
[FreeTextEntry1] : Patient with primary hyperparathyroidism with kidney stones and osteoporosis. doing well postop.  Sonja sent , RTO 6 weeks

## 2019-07-22 NOTE — PHYSICAL EXAM
[de-identified] : no cervical or supraclavicular adenopathy, trachea midline, thyroid without enlargement or palpable mass. incision healing without swelling, scar min discussed [Normal] : orientation to person, place, and time: normal

## 2019-07-22 NOTE — HISTORY OF PRESENT ILLNESS
[de-identified] : Patient referred by Dr. Acosta for evaluation of primary hyperparathyroidism. Patient was diagnosed with elevated calcium 3 years ago. Reports history of kidney stones, hypertension, osteoporosis, reflux, increased thirst, fatigue, occasional dysphagia and radiation treatment for breast cancer 7 years ago. Calcium 11, PTH 81, vitamin D 38. Bone density wrist T. -3.6.\par 7/16/19 Right inf parathyroidectomy with resection left lower thyroid nodule.  path benign.  Denies dysphagia, hoarseness or parathesias.

## 2019-07-24 LAB
25(OH)D3 SERPL-MCNC: 39.2 NG/ML
CALCIUM SERPL-MCNC: 10.7 MG/DL
CALCIUM SERPL-MCNC: 10.7 MG/DL
PARATHYROID HORMONE INTACT: 9 PG/ML

## 2019-07-30 ENCOUNTER — APPOINTMENT (OUTPATIENT)
Dept: OBGYN | Facility: CLINIC | Age: 74
End: 2019-07-30
Payer: MEDICARE

## 2019-07-30 VITALS
HEART RATE: 71 BPM | DIASTOLIC BLOOD PRESSURE: 87 MMHG | BODY MASS INDEX: 28.17 KG/M2 | HEIGHT: 64 IN | SYSTOLIC BLOOD PRESSURE: 137 MMHG | WEIGHT: 165 LBS

## 2019-07-30 DIAGNOSIS — Z01.419 ENCOUNTER FOR GYNECOLOGICAL EXAMINATION (GENERAL) (ROUTINE) W/OUT ABNORMAL FINDINGS: ICD-10-CM

## 2019-07-30 PROCEDURE — 99397 PER PM REEVAL EST PAT 65+ YR: CPT

## 2019-07-30 NOTE — CHIEF COMPLAINT
[Annual Visit] : annual visit [FreeTextEntry1] : recent parathyroid surgery secondary to kidney stones \par Mammograms followed by Dr. Rojas for Breast cancer \par On anastrozole

## 2019-07-30 NOTE — HISTORY OF PRESENT ILLNESS
[Last Bone Density ___] : Last bone density studies [unfilled] [Last Mammogram ___] : Last Mammogram was [unfilled] [Last Colonoscopy ___] : Last colonoscopy [unfilled] [Last Pap ___] : Last cervical pap smear was [unfilled]

## 2019-07-30 NOTE — PHYSICAL EXAM
[Awake] : awake [Alert] : alert [Soft] : soft [Oriented x3] : oriented to person, place, and time [Normal] : uterus [Atrophy] : atrophy [Uterine Adnexae] : were not tender and not enlarged [No Bleeding] : there was no active vaginal bleeding [Acute Distress] : no acute distress [Mass] : no breast mass [Tender] : non tender [Axillary LAD] : no axillary lymphadenopathy [Nipple Discharge] : no nipple discharge

## 2019-09-04 ENCOUNTER — APPOINTMENT (OUTPATIENT)
Dept: SURGERY | Facility: CLINIC | Age: 74
End: 2019-09-04
Payer: MEDICARE

## 2019-09-04 PROCEDURE — 36415 COLL VENOUS BLD VENIPUNCTURE: CPT

## 2019-09-04 PROCEDURE — 99024 POSTOP FOLLOW-UP VISIT: CPT

## 2019-09-04 NOTE — PHYSICAL EXAM
[de-identified] : no cervical or supraclavicular adenopathy, trachea midline, thyroid without enlargement or palpable mass. incision healing without swelling, scar min discussed [Normal] : orientation to person, place, and time: normal

## 2019-09-04 NOTE — HISTORY OF PRESENT ILLNESS
[de-identified] : Patient referred by Dr. Acosta for evaluation of primary hyperparathyroidism. Patient was diagnosed with elevated calcium 3 years ago. Reports history of kidney stones, hypertension, osteoporosis, reflux, increased thirst, fatigue, occasional dysphagia and radiation treatment for breast cancer 7 years ago. Calcium 11, PTH 81, vitamin D 38. Bone density wrist T. -3.6.\par 7/16/19 Right inf parathyroidectomy with resection left lower thyroid nodule.  path benign.  Denies dysphagia, hoarseness or parathesias.

## 2019-09-04 NOTE — ASSESSMENT
[FreeTextEntry1] : Patient with primary hyperparathyroidism with kidney stones and osteoporosis. doing well postop.  Sonja sent , RTO 4 mo

## 2019-09-06 LAB
25(OH)D3 SERPL-MCNC: 43.2 NG/ML
CALCIUM SERPL-MCNC: 9.4 MG/DL
CALCIUM SERPL-MCNC: 9.4 MG/DL
PARATHYROID HORMONE INTACT: 29 PG/ML

## 2019-09-08 NOTE — ASU PREOP CHECKLIST - HIBICLENS SHOWER 1 DATE
[facility] EMERGENCY DEPT 
[address] 
[facility], [place], operated by [facility] [id number] [phone] Work/School Note Date: 9/8/2019 To Whom It May concern: 
 
Martin Rocha was seen and treated today in the emergency room by the following provider(s): 
Attending Provider: Rebecca Ramey MD. Martin Rocha may return to work on 09/11/2019.  
 
Sincerely, 
 
 
 
 
Siva Smyth RN 
 
 
 

16-Jul-2019 08:00

## 2019-09-16 ENCOUNTER — APPOINTMENT (OUTPATIENT)
Dept: DERMATOLOGY | Facility: CLINIC | Age: 74
End: 2019-09-16
Payer: MEDICARE

## 2019-09-16 DIAGNOSIS — B07.8 OTHER VIRAL WARTS: ICD-10-CM

## 2019-09-16 DIAGNOSIS — R23.8 OTHER SKIN CHANGES: ICD-10-CM

## 2019-09-16 PROCEDURE — 99214 OFFICE O/P EST MOD 30 MIN: CPT

## 2019-09-22 ENCOUNTER — RX RENEWAL (OUTPATIENT)
Age: 74
End: 2019-09-22

## 2019-10-08 ENCOUNTER — APPOINTMENT (OUTPATIENT)
Dept: INTERNAL MEDICINE | Facility: CLINIC | Age: 74
End: 2019-10-08
Payer: MEDICARE

## 2019-10-08 VITALS
HEART RATE: 78 BPM | SYSTOLIC BLOOD PRESSURE: 138 MMHG | OXYGEN SATURATION: 98 % | WEIGHT: 165 LBS | BODY MASS INDEX: 28.32 KG/M2 | DIASTOLIC BLOOD PRESSURE: 72 MMHG

## 2019-10-08 VITALS — SYSTOLIC BLOOD PRESSURE: 120 MMHG | DIASTOLIC BLOOD PRESSURE: 60 MMHG

## 2019-10-08 PROCEDURE — 90662 IIV NO PRSV INCREASED AG IM: CPT

## 2019-10-08 PROCEDURE — 99214 OFFICE O/P EST MOD 30 MIN: CPT | Mod: 25

## 2019-10-08 PROCEDURE — G0008: CPT

## 2019-10-08 NOTE — HISTORY OF PRESENT ILLNESS
[FreeTextEntry1] : In for follow-up of hypertension and hyperlipidemia [de-identified] : Interval history:\par In May, had right sided kidney stone removed at [facility].. Briefly had stent which was removed 2 weeks later. \par Status post right parathyroid resection with resection of a left sided thyroid nodule as well. Had post up visit and doing well.\par Also seen at derm - tx for AK on nose and verruca on forehead - f/u with Derm pending. \par

## 2019-10-08 NOTE — PHYSICAL EXAM
[Normal Outer Ear/Nose] : the outer ears and nose were normal in appearance [No Lymphadenopathy] : no lymphadenopathy [Supple] : supple [Clear to Auscultation] : lungs were clear to auscultation bilaterally [No Respiratory Distress] : no respiratory distress  [Normal Rate] : normal rate  [Regular Rhythm] : with a regular rhythm [Normal S1, S2] : normal S1 and S2 [Soft] : abdomen soft [No Edema] : there was no peripheral edema [Non Tender] : non-tender [Normal Bowel Sounds] : normal bowel sounds [Normal Anterior Cervical Nodes] : no anterior cervical lymphadenopathy [No CVA Tenderness] : no CVA  tenderness [No Spinal Tenderness] : no spinal tenderness [Kyphosis] : kyphosis [Normal] : no joint swelling and grossly normal strength and tone [No Rash] : no rash [Coordination Grossly Intact] : coordination grossly intact [No Focal Deficits] : no focal deficits [Normal Insight/Judgement] : insight and judgment were intact [de-identified] : cobblestoning, no exudate

## 2019-10-08 NOTE — REVIEW OF SYSTEMS
[Nasal Discharge] : nasal discharge [Postnasal Drip] : postnasal drip [Insomnia] : insomnia [Negative] : Heme/Lymph [Hoarseness] : no hoarseness [Lower Ext Edema] : no lower extremity edema [Depression] : no depression [FreeTextEntry4] : allergies [de-identified] : see hpi

## 2019-10-14 ENCOUNTER — APPOINTMENT (OUTPATIENT)
Dept: DERMATOLOGY | Facility: CLINIC | Age: 74
End: 2019-10-14
Payer: MEDICARE

## 2019-10-14 ENCOUNTER — LABORATORY RESULT (OUTPATIENT)
Age: 74
End: 2019-10-14

## 2019-10-14 PROCEDURE — 11302 SHAVE SKIN LESION 1.1-2.0 CM: CPT | Mod: GC

## 2019-10-14 PROCEDURE — 99214 OFFICE O/P EST MOD 30 MIN: CPT | Mod: GC,25

## 2020-01-06 ENCOUNTER — APPOINTMENT (OUTPATIENT)
Dept: SURGERY | Facility: CLINIC | Age: 75
End: 2020-01-06
Payer: MEDICARE

## 2020-01-06 PROCEDURE — 99213 OFFICE O/P EST LOW 20 MIN: CPT

## 2020-01-06 PROCEDURE — 36415 COLL VENOUS BLD VENIPUNCTURE: CPT

## 2020-01-06 NOTE — PHYSICAL EXAM
[de-identified] : no cervical or supraclavicular adenopathy, trachea midline, thyroid without enlargement or palpable mass. incision healing well, scar min discussed [Normal] : no neck adenopathy [de-identified] : Skin:  normal appearance.  no rash, nodules, vesicles, or erythema,\par Musculoskeletal:  full range of motion and no deformities appreciated\par Neurological:  grossly intact\par Psychiatric:  oriented to person, place and time with appropriate affect

## 2020-01-06 NOTE — HISTORY OF PRESENT ILLNESS
[de-identified] : Patient referred by Dr. Acosta for evaluation of primary hyperparathyroidism. Patient was diagnosed with elevated calcium 3 years ago. Reports history of kidney stones, hypertension, osteoporosis, reflux, increased thirst, fatigue, occasional dysphagia and radiation treatment for breast cancer 7 years ago. Calcium 11, PTH 81, vitamin D 38. Bone density wrist T. -3.6.\par 7/16/19 Right inf parathyroidectomy with resection left lower thyroid nodule.  path benign.  Denies dysphagia, hoarseness or parathesias. currently taking calcium once a day.

## 2020-01-06 NOTE — ASSESSMENT
[FreeTextEntry1] : Patient with primary hyperparathyroidism with kidney stones and osteoporosis. doing well   Sonja sent , will continue under care oncologist for follow up of osteoporosis. and return as needed.

## 2020-01-07 LAB
25(OH)D3 SERPL-MCNC: 63.8 NG/ML
CALCIUM SERPL-MCNC: 9.8 MG/DL
CALCIUM SERPL-MCNC: 9.8 MG/DL
PARATHYROID HORMONE INTACT: 29 PG/ML

## 2020-02-11 ENCOUNTER — NON-APPOINTMENT (OUTPATIENT)
Age: 75
End: 2020-02-11

## 2020-02-11 ENCOUNTER — APPOINTMENT (OUTPATIENT)
Dept: INTERNAL MEDICINE | Facility: CLINIC | Age: 75
End: 2020-02-11
Payer: MEDICARE

## 2020-02-11 VITALS — SYSTOLIC BLOOD PRESSURE: 132 MMHG | DIASTOLIC BLOOD PRESSURE: 70 MMHG

## 2020-02-11 VITALS
OXYGEN SATURATION: 98 % | HEIGHT: 63 IN | WEIGHT: 158 LBS | DIASTOLIC BLOOD PRESSURE: 80 MMHG | HEART RATE: 94 BPM | BODY MASS INDEX: 28 KG/M2 | SYSTOLIC BLOOD PRESSURE: 150 MMHG

## 2020-02-11 DIAGNOSIS — Z71.89 OTHER SPECIFIED COUNSELING: ICD-10-CM

## 2020-02-11 PROCEDURE — G0439: CPT

## 2020-02-11 PROCEDURE — G0442 ANNUAL ALCOHOL SCREEN 15 MIN: CPT

## 2020-02-11 PROCEDURE — 93000 ELECTROCARDIOGRAM COMPLETE: CPT | Mod: 59

## 2020-02-11 PROCEDURE — 99497 ADVNCD CARE PLAN 30 MIN: CPT | Mod: 33

## 2020-02-11 NOTE — HEALTH RISK ASSESSMENT
[Patient reported mammogram was normal] : Patient reported mammogram was normal [HIV test declined] : HIV test declined [None] : None [Hepatitis C test declined] : Hepatitis C test declined [With Family] : lives with family [Retired] : retired [] :  [Feels Safe at Home] : Feels safe at home [Fully functional (bathing, dressing, toileting, transferring, walking, feeding)] : Fully functional (bathing, dressing, toileting, transferring, walking, feeding) [Fully functional (using the telephone, shopping, preparing meals, housekeeping, doing laundry, using] : Fully functional and needs no help or supervision to perform IADLs (using the telephone, shopping, preparing meals, housekeeping, doing laundry, using transportation, managing medications and managing finances) [Smoke Detector] : smoke detector [Carbon Monoxide Detector] : carbon monoxide detector [Guns at Home] : guns at home [Seat Belt] :  uses seat belt [With Patient/Caregiver] : With Patient/Caregiver [Name: ___] : Health Care Proxy's Name: [unfilled]  [Relationship: ___] : Relationship: [unfilled] [No falls in past year] : Patient reported no falls in the past year [Designated Healthcare Proxy] : Designated healthcare proxy [] : No [de-identified] : surgery, derm for surveillance [de-identified] : see sbirt - discussed potential insomnia exacerbation assoc with ETOH [Audit-CScore] : 4 [de-identified] : laundry and walking and stairs [de-identified] : varied [[id number]] : 3 [Reports changes in hearing] : Reports no changes in hearing [Reports changes in vision] : Reports no changes in vision [MammogramDate] : 07/19 [MammogramComments] : sees Dr Rojas -  [BoneDensityComments] : utd [PapSmearComments] : na [ColonoscopyComments] : utd [de-identified] : occ [AdvancecareDate] : 02/20

## 2020-02-11 NOTE — DISCUSSION/SUMMARY
[Subsequent Annual Wellness] : Subsequent Annual Wellness Visit [Risks and Benefits Discussed] : the risks and benefits of screening were discussed [EKG] : EKG recommended [Healthy Diet] : counseling was given on maintaining a healthy diet [Blood Glucose] : due for blood glucose [Improve Physical Activity] : counseling was given on ways to improve physical activity [Screening Not Indicated] : screening not indicated [Screening Current] : screening is current [Patient Declines] : the patient declines screening [Influenza Recommended Annually] : influenza vaccination is recommended annually [Influenza UTD This Year] : influenza vaccine is up to date this year [Lifetime Vaccine Completed] : the lifetime pneumococcal vaccine has been completed [Tdap Vaccine UTD] : Tdap vaccination up to date [Paperwork & Instructions Given] : paperwork and instructions were given to the patient [Encouraged to F/U to Discuss Questions/Decisions] : ~he/she~ was encouraged to follow-up with me to discuss ~his/her~ questions and/or decisions [Patient] : plan discussed with the patient [Cardiology] : cardiology [Follow-Up in ___] : follow-up visit needed in [unfilled]

## 2020-02-11 NOTE — PHYSICAL EXAM
[Normal Outer Ear/Nose] : the outer ears and nose were normal in appearance [No Lymphadenopathy] : no lymphadenopathy [Supple] : supple [No Respiratory Distress] : no respiratory distress  [Clear to Auscultation] : lungs were clear to auscultation bilaterally [Normal Rate] : normal rate  [Regular Rhythm] : with a regular rhythm [Normal S1, S2] : normal S1 and S2 [No Carotid Bruits] : no carotid bruits [Pedal Pulses Present] : the pedal pulses are present [No Edema] : there was no peripheral edema [Normal Appearance] : normal in appearance [No Masses] : no palpable masses [No Axillary Lymphadenopathy] : no axillary lymphadenopathy [Soft] : abdomen soft [Non Tender] : non-tender [Normal Bowel Sounds] : normal bowel sounds [Normal Axillary Nodes] : no axillary lymphadenopathy [Normal Anterior Cervical Nodes] : no anterior cervical lymphadenopathy [Normal Posterior Cervical Nodes] : no posterior cervical lymphadenopathy [No CVA Tenderness] : no CVA  tenderness [No Spinal Tenderness] : no spinal tenderness [Kyphosis] : kyphosis [Normal] : no joint swelling and grossly normal strength and tone [No Rash] : no rash [Coordination Grossly Intact] : coordination grossly intact [No Focal Deficits] : no focal deficits [Deep Tendon Reflexes (DTR)] : deep tendon reflexes were 2+ and symmetric [Normal Affect] : the affect was normal [Normal Insight/Judgement] : insight and judgment were intact [de-identified] : no neck tenderness [de-identified] : mild cobblestoning, no exudate [de-identified] : small well healed surg scar on right

## 2020-02-11 NOTE — ASSESSMENT
[FreeTextEntry1] : Annual alcohol screen completed this visit. Alcohol use mostly within healthy limits.  Healthy drinking guidelines shared with patient (Female and male overage 65 no more than 3 SSD on any day, no more than 7 per week). Discussed use of melatonin 3 hours prior to sleep to increase its efficacy and advised trial of using for sleep as one drink at bedtime often assoc with middle insomnia.

## 2020-02-11 NOTE — REVIEW OF SYSTEMS
[Nasal Discharge] : nasal discharge [Postnasal Drip] : postnasal drip [Negative] : Heme/Lymph [Mole Changes] : no mole changes [Skin Rash] : no skin rash [FreeTextEntry9] : Occ LBP when leaning over for prolonged period of time [de-identified] : cyst recently removed from behind knee by derm

## 2020-02-15 LAB
ALBUMIN SERPL ELPH-MCNC: 4.6 G/DL
ALP BLD-CCNC: 92 U/L
ALT SERPL-CCNC: 13 U/L
ANION GAP SERPL CALC-SCNC: 17 MMOL/L
AST SERPL-CCNC: 23 U/L
BASOPHILS # BLD AUTO: 0.04 K/UL
BASOPHILS NFR BLD AUTO: 0.7 %
BILIRUB SERPL-MCNC: 0.5 MG/DL
BUN SERPL-MCNC: 22 MG/DL
CALCIUM SERPL-MCNC: 9.8 MG/DL
CHLORIDE SERPL-SCNC: 103 MMOL/L
CHOLEST SERPL-MCNC: 222 MG/DL
CHOLEST/HDLC SERPL: 1.9 RATIO
CO2 SERPL-SCNC: 24 MMOL/L
CREAT SERPL-MCNC: 0.85 MG/DL
EOSINOPHIL # BLD AUTO: 0.28 K/UL
EOSINOPHIL NFR BLD AUTO: 4.7 %
ESTIMATED AVERAGE GLUCOSE: 111 MG/DL
GLUCOSE SERPL-MCNC: 99 MG/DL
HBA1C MFR BLD HPLC: 5.5 %
HCT VFR BLD CALC: 48.4 %
HDLC SERPL-MCNC: 114 MG/DL
HGB BLD-MCNC: 15.3 G/DL
IMM GRANULOCYTES NFR BLD AUTO: 0 %
LDLC SERPL CALC-MCNC: 80 MG/DL
LYMPHOCYTES # BLD AUTO: 1.05 K/UL
LYMPHOCYTES NFR BLD AUTO: 17.6 %
MAN DIFF?: NORMAL
MCHC RBC-ENTMCNC: 31.6 GM/DL
MCHC RBC-ENTMCNC: 33 PG
MCV RBC AUTO: 104.5 FL
MONOCYTES # BLD AUTO: 0.49 K/UL
MONOCYTES NFR BLD AUTO: 8.2 %
NEUTROPHILS # BLD AUTO: 4.12 K/UL
NEUTROPHILS NFR BLD AUTO: 68.8 %
PLATELET # BLD AUTO: 243 K/UL
POTASSIUM SERPL-SCNC: 3.9 MMOL/L
PROT SERPL-MCNC: 7.2 G/DL
RBC # BLD: 4.63 M/UL
RBC # FLD: 12.6 %
SODIUM SERPL-SCNC: 145 MMOL/L
TRIGL SERPL-MCNC: 139 MG/DL
WBC # FLD AUTO: 5.98 K/UL

## 2020-02-24 ENCOUNTER — APPOINTMENT (OUTPATIENT)
Dept: DERMATOLOGY | Facility: CLINIC | Age: 75
End: 2020-02-24
Payer: MEDICARE

## 2020-02-24 PROCEDURE — 99214 OFFICE O/P EST MOD 30 MIN: CPT

## 2020-03-10 ENCOUNTER — APPOINTMENT (OUTPATIENT)
Dept: CARDIOLOGY | Facility: CLINIC | Age: 75
End: 2020-03-10
Payer: MEDICARE

## 2020-03-10 VITALS — DIASTOLIC BLOOD PRESSURE: 80 MMHG | SYSTOLIC BLOOD PRESSURE: 130 MMHG

## 2020-03-10 VITALS
HEART RATE: 86 BPM | BODY MASS INDEX: 28.53 KG/M2 | OXYGEN SATURATION: 96 % | DIASTOLIC BLOOD PRESSURE: 80 MMHG | WEIGHT: 161 LBS | SYSTOLIC BLOOD PRESSURE: 180 MMHG | HEIGHT: 63 IN

## 2020-03-10 PROCEDURE — 99204 OFFICE O/P NEW MOD 45 MIN: CPT

## 2020-03-10 NOTE — PHYSICAL EXAM
[General Appearance - Well Developed] : well developed [Normal Appearance] : normal appearance [Well Groomed] : well groomed [General Appearance - Well Nourished] : well nourished [No Deformities] : no deformities [General Appearance - In No Acute Distress] : no acute distress [Normal Conjunctiva] : the conjunctiva exhibited no abnormalities [Eyelids - No Xanthelasma] : the eyelids demonstrated no xanthelasmas [Normal Oral Mucosa] : normal oral mucosa [No Oral Pallor] : no oral pallor [No Oral Cyanosis] : no oral cyanosis [Normal Jugular Venous A Waves Present] : normal jugular venous A waves present [Normal Jugular Venous V Waves Present] : normal jugular venous V waves present [No Jugular Venous Snell A Waves] : no jugular venous snell A waves [Heart Rate And Rhythm] : heart rate and rhythm were normal [Heart Sounds] : normal S1 and S2 [Murmurs] : no murmurs present [Respiration, Rhythm And Depth] : normal respiratory rhythm and effort [Exaggerated Use Of Accessory Muscles For Inspiration] : no accessory muscle use [Auscultation Breath Sounds / Voice Sounds] : lungs were clear to auscultation bilaterally [Abdomen Soft] : soft [Abdomen Tenderness] : non-tender [Abdomen Mass (___ Cm)] : no abdominal mass palpated [Nail Clubbing] : no clubbing of the fingernails [Cyanosis, Localized] : no localized cyanosis [Petechial Hemorrhages (___cm)] : no petechial hemorrhages [Skin Color & Pigmentation] : normal skin color and pigmentation [] : no rash [No Venous Stasis] : no venous stasis [Skin Lesions] : no skin lesions [No Skin Ulcers] : no skin ulcer [No Xanthoma] : no  xanthoma was observed [Oriented To Time, Place, And Person] : oriented to person, place, and time [Affect] : the affect was normal [Mood] : the mood was normal [No Anxiety] : not feeling anxious

## 2020-03-10 NOTE — HISTORY OF PRESENT ILLNESS
[FreeTextEntry1] : Dear Christie,\par \par Thank you for referring Patricia Rene for cardiac evaluation and repeat echocardiography because of LVH on her EKG.  She is mildly hypertensive, but has no prior history of heart disease and functions normally with no cardiac symptoms.  Voltage criteria for LVH was first noted in 3/16 and an echocardiogram done at that time did not show any left ventricular enlargement.  She has had a slight increase in voltage since, but remains asymptomatic.\par \par She is in otherwise good general health.  She has a prior history of breast cancer and is on aromatase inhibitor currently.  She also has a history of kidney stones.

## 2020-03-10 NOTE — DISCUSSION/SUMMARY
[FreeTextEntry1] : In summary,  is a hypertensive 74-year-old female with LVH noted on an EKG.  She has no symptoms.  Her exam shows normal repeat blood pressure, regular rhythm, and a normal cardiac exam.  Her EKG shows voltage for LVH with the RN 1 and the S and 3 about 30 mm in height.  There are no ST segment depression.\par \par The EKG pattern is longstanding and only slightly changed from the past.  She will be scheduled for an echocardiogram to ensure that she has not developed LVH.\par \par Thanks for referring her to us.\par \par \par \par Kevin Sanford MD  [facility]

## 2020-03-16 ENCOUNTER — APPOINTMENT (OUTPATIENT)
Dept: CARDIOLOGY | Facility: CLINIC | Age: 75
End: 2020-03-16
Payer: MEDICARE

## 2020-03-16 PROCEDURE — 93306 TTE W/DOPPLER COMPLETE: CPT

## 2020-06-08 ENCOUNTER — RX RENEWAL (OUTPATIENT)
Age: 75
End: 2020-06-08

## 2020-06-16 ENCOUNTER — APPOINTMENT (OUTPATIENT)
Dept: INTERNAL MEDICINE | Facility: CLINIC | Age: 75
End: 2020-06-16

## 2020-06-22 ENCOUNTER — APPOINTMENT (OUTPATIENT)
Dept: DERMATOLOGY | Facility: CLINIC | Age: 75
End: 2020-06-22
Payer: MEDICARE

## 2020-06-22 VITALS — BODY MASS INDEX: 26.8 KG/M2 | HEIGHT: 64 IN | WEIGHT: 157 LBS

## 2020-06-22 VITALS — TEMPERATURE: 96.9 F

## 2020-06-22 PROCEDURE — 99213 OFFICE O/P EST LOW 20 MIN: CPT | Mod: GC

## 2020-07-14 ENCOUNTER — APPOINTMENT (OUTPATIENT)
Dept: INTERNAL MEDICINE | Facility: CLINIC | Age: 75
End: 2020-07-14
Payer: MEDICARE

## 2020-07-14 VITALS
WEIGHT: 158 LBS | OXYGEN SATURATION: 98 % | BODY MASS INDEX: 26.98 KG/M2 | SYSTOLIC BLOOD PRESSURE: 118 MMHG | HEIGHT: 64 IN | HEART RATE: 100 BPM | DIASTOLIC BLOOD PRESSURE: 80 MMHG

## 2020-07-14 DIAGNOSIS — D75.89 OTHER SPECIFIED DISEASES OF BLOOD AND BLOOD-FORMING ORGANS: ICD-10-CM

## 2020-07-14 DIAGNOSIS — I51.7 CARDIOMEGALY: ICD-10-CM

## 2020-07-14 DIAGNOSIS — Z20.828 CONTACT WITH AND (SUSPECTED) EXPOSURE TO OTHER VIRAL COMMUNICABLE DISEASES: ICD-10-CM

## 2020-07-14 PROCEDURE — 99214 OFFICE O/P EST MOD 30 MIN: CPT

## 2020-07-20 PROBLEM — D75.89 MACROCYTOSIS: Status: ACTIVE | Noted: 2017-02-10

## 2020-07-20 NOTE — PHYSICAL EXAM
[No Acute Distress] : no acute distress [Well-Appearing] : well-appearing [Normal Sclera/Conjunctiva] : normal sclera/conjunctiva [Normal Outer Ear/Nose] : the outer ears and nose were normal in appearance [EOMI] : extraocular movements intact [No Lymphadenopathy] : no lymphadenopathy [Supple] : supple [Clear to Auscultation] : lungs were clear to auscultation bilaterally [Normal Rate] : normal rate  [No Respiratory Distress] : no respiratory distress  [Regular Rhythm] : with a regular rhythm [No Murmur] : no murmur heard [Pedal Pulses Present] : the pedal pulses are present [No Edema] : there was no peripheral edema [Soft] : abdomen soft [Normal Anterior Cervical Nodes] : no anterior cervical lymphadenopathy [Non Tender] : non-tender [No Joint Swelling] : no joint swelling [No Spinal Tenderness] : no spinal tenderness [Kyphosis] : kyphosis [Coordination Grossly Intact] : coordination grossly intact [No Rash] : no rash [Grossly Normal Strength/Tone] : grossly normal strength/tone [de-identified] : bilateral cerumen impaction [No Focal Deficits] : no focal deficits

## 2020-07-20 NOTE — ASSESSMENT
[FreeTextEntry1] : RE emphysema on problem list - reviewed chart based on timing when problem was added.  Found it was added by Dr Mcnulty based on an xray. Advised further evaluation/referral to Pulmonologist - she currently declines eval by pulm to address finding of emphysema on CXR in 2019 \par Also declines ENT referral for cerumen impaction with occ tinnitus/whooshing sound. \par Ordered bloodwork for today -  however she declines to do it. Say she recently had blood work done at oncology and doesn’t want to do any more at this time.  - she will have what was done faxed here.

## 2020-07-20 NOTE — HISTORY OF PRESENT ILLNESS
[de-identified] : Generally doing well. \par c/o occ tinnitus, no sig change in hearing and no dizziness. \par Reports she looked over her chart in [facility] - noted emphysema listed on her problem list - inquiring why it is there.  [FreeTextEntry1] : here for f/u of HTN, HLD

## 2020-07-20 NOTE — REVIEW OF SYSTEMS
[Earache] : no earache [Nasal Discharge] : nasal discharge [Postnasal Drip] : postnasal drip [Skin Rash] : no skin rash [Negative] : Heme/Lymph [FreeTextEntry4] : see hpi

## 2020-10-19 ENCOUNTER — APPOINTMENT (OUTPATIENT)
Dept: OTOLARYNGOLOGY | Facility: CLINIC | Age: 75
End: 2020-10-19
Payer: MEDICARE

## 2020-10-19 VITALS
DIASTOLIC BLOOD PRESSURE: 60 MMHG | HEIGHT: 64 IN | TEMPERATURE: 97.6 F | SYSTOLIC BLOOD PRESSURE: 156 MMHG | WEIGHT: 150 LBS | BODY MASS INDEX: 25.61 KG/M2

## 2020-10-19 DIAGNOSIS — H93.8X2 OTHER SPECIFIED DISORDERS OF LEFT EAR: ICD-10-CM

## 2020-10-19 DIAGNOSIS — H61.23 IMPACTED CERUMEN, BILATERAL: ICD-10-CM

## 2020-10-19 PROCEDURE — 99204 OFFICE O/P NEW MOD 45 MIN: CPT | Mod: 25

## 2020-10-19 PROCEDURE — 69210 REMOVE IMPACTED EAR WAX UNI: CPT

## 2020-10-19 PROCEDURE — 99072 ADDL SUPL MATRL&STAF TM PHE: CPT

## 2020-10-19 NOTE — PROCEDURE
[FreeTextEntry3] : Procedure - Cerumen Removal. \par After informed verbal consent is obtained, cerumen is removed from the b/l ear canal with Peroxide and suction.  Normal appearing canal following removal.\par

## 2020-10-19 NOTE — PHYSICAL EXAM
[Midline] : trachea located in midline position [Normal] : no rashes [de-identified] : well healed parathyroidectomy incision [de-identified] : B/l cerumen pushed up on TM's

## 2020-10-19 NOTE — ASSESSMENT
[FreeTextEntry1] : Ear fullness due to Cerumen against TM\par - Cerumen removed in office today\par - Discussed avoiding Q-tip use in the ears. \par - Feels hearing is good and declined audiogram. \par - F/U PRN

## 2020-10-19 NOTE — CONSULT LETTER
[Dear  ___] : Dear  [unfilled], [Consult Letter:] : I had the pleasure of evaluating your patient, [unfilled]. [Please see my note below.] : Please see my note below. [Consult Closing:] : Thank you very much for allowing me to participate in the care of this patient.  If you have any questions, please do not hesitate to contact me. [Sincerely,] : Sincerely, [FreeTextEntry3] : Cecelia Bravo MD\par Otolaryngology and Cranial Base Surgery\par Attending Physician - Department of Otolaryngology and Head & Neck Surgery\par [facility]\par  - [employer] [facility]\par Office: [phone]\par Fax: [phone]\par

## 2020-12-07 ENCOUNTER — APPOINTMENT (OUTPATIENT)
Dept: DERMATOLOGY | Facility: CLINIC | Age: 75
End: 2020-12-07
Payer: MEDICARE

## 2020-12-07 DIAGNOSIS — Z86.03 PERSONAL HISTORY OF NEOPLASM OF UNCERTAIN BEHAVIOR: ICD-10-CM

## 2020-12-07 PROCEDURE — 17000 DESTRUCT PREMALG LESION: CPT

## 2020-12-07 PROCEDURE — 99213 OFFICE O/P EST LOW 20 MIN: CPT | Mod: 25

## 2020-12-07 PROCEDURE — 99072 ADDL SUPL MATRL&STAF TM PHE: CPT

## 2020-12-21 PROBLEM — Z01.419 ENCOUNTER FOR GYNECOLOGICAL EXAMINATION: Status: RESOLVED | Noted: 2019-07-30 | Resolved: 2020-12-21

## 2020-12-30 ENCOUNTER — NON-APPOINTMENT (OUTPATIENT)
Age: 75
End: 2020-12-30

## 2020-12-30 ENCOUNTER — APPOINTMENT (OUTPATIENT)
Dept: INTERNAL MEDICINE | Facility: CLINIC | Age: 75
End: 2020-12-30
Payer: MEDICARE

## 2020-12-30 VITALS
DIASTOLIC BLOOD PRESSURE: 70 MMHG | BODY MASS INDEX: 25.95 KG/M2 | WEIGHT: 152 LBS | HEART RATE: 90 BPM | OXYGEN SATURATION: 98 % | SYSTOLIC BLOOD PRESSURE: 140 MMHG | HEIGHT: 64 IN

## 2020-12-30 PROCEDURE — 99072 ADDL SUPL MATRL&STAF TM PHE: CPT

## 2020-12-30 PROCEDURE — 99214 OFFICE O/P EST MOD 30 MIN: CPT

## 2020-12-30 PROCEDURE — 93000 ELECTROCARDIOGRAM COMPLETE: CPT

## 2020-12-30 NOTE — PHYSICAL EXAM
[Normal] : no acute distress, well nourished, well developed and well-appearing [PERRL] : pupils equal round and reactive to light [EOMI] : extraocular movements intact [Normal Outer Ear/Nose] : the outer ears and nose were normal in appearance [No Lymphadenopathy] : no lymphadenopathy [No Respiratory Distress] : no respiratory distress  [Clear to Auscultation] : lungs were clear to auscultation bilaterally [Normal Rate] : normal rate  [Regular Rhythm] : with a regular rhythm [No Edema] : there was no peripheral edema [Normal Anterior Cervical Nodes] : no anterior cervical lymphadenopathy [No CVA Tenderness] : no CVA  tenderness [No Spinal Tenderness] : no spinal tenderness [Kyphosis] : kyphosis [Grossly Normal Strength/Tone] : grossly normal strength/tone [No Rash] : no rash [Coordination Grossly Intact] : coordination grossly intact [No Focal Deficits] : no focal deficits [Normal Affect] : the affect was normal

## 2021-01-01 NOTE — ASSESSMENT
[High Risk Surgery - Intraperitoneal, Intrathoracic or Supringuinal Vascular Procedures] : High Risk Surgery - Intraperitoneal, Intrathoracic or Supringuinal Vascular Procedures - No (0) [Ischemic Heart Disease] : Ischemic Heart Disease - No (0) [Congestive Heart Failure] : Congestive Heart Failure - No (0) [Prior Cerebrovascular Accident or TIA] : Prior Cerebrovascular Accident or TIA - No (0) [Creatinine >= 2mg/dL (1 Point)] : Creatinine >= 2mg/dL - No (0) [Insulin-dependent Diabetic (1 Point)] : Insulin-dependent Diabetic - No (0) [0] : 0 , RCRI Class: I, Risk of Post-Op Cardiac Complications: 3.9%, 95% CI for Risk Estimate: 2.8% - 5.4% [Patient Optimized for Surgery] : Patient optimized for surgery [As per surgery] : as per surgery [FreeTextEntry5] : h [FreeTextEntry7] : hold NSAIDS one week prior to procedure - take BP medication the AM of surgery - any other daily meds can be taken after the procedure when eating and drinking normally.

## 2021-01-01 NOTE — PLAN
[FreeTextEntry1] : \par * PLEASE NOTE - PST labs not available for review - please be sure to review prior to surgery.  As long as PST's unremarkable, then there is no contraindication to proceeding with procedure.

## 2021-01-01 NOTE — HISTORY OF PRESENT ILLNESS
[No Pertinent Cardiac History] : no history of aortic stenosis, atrial fibrillation, coronary artery disease, recent myocardial infarction, or implantable device/pacemaker [(Patient denies any chest pain, claudication, dyspnea on exertion, orthopnea, palpitations or syncope)] : Patient denies any chest pain, claudication, dyspnea on exertion, orthopnea, palpitations or syncope [____ METs%] : [unfilled] METs% [Moderate (4-6 METs)] : Moderate (4-6 METs) [Asthma] : no asthma [COPD] : no COPD [Sleep Apnea] : no sleep apnea [Smoker] : not a smoker [Chronic Anticoagulation] : no chronic anticoagulation [Chronic Kidney Disease] : no chronic kidney disease [Diabetes] : no diabetes [FreeTextEntry1] : laser lithotripsy with possible stent [FreeTextEntry2] : 1/13/20 [FreeTextEntry3] : Dr Meyers [FreeTextEntry4] : not having any sx - left sided nephrolithiasis\par Lives recently done at her oncologist, and at presurgical testing.She will have them faxed here. [FreeTextEntry8] : about 5000 steps a day, multiple stairs (3 flights, several times a day), does most of her housework

## 2021-01-04 ENCOUNTER — APPOINTMENT (OUTPATIENT)
Dept: DERMATOLOGY | Facility: CLINIC | Age: 76
End: 2021-01-04
Payer: MEDICARE

## 2021-01-04 DIAGNOSIS — L81.4 OTHER MELANIN HYPERPIGMENTATION: ICD-10-CM

## 2021-01-04 DIAGNOSIS — L82.0 INFLAMED SEBORRHEIC KERATOSIS: ICD-10-CM

## 2021-01-04 PROCEDURE — 99213 OFFICE O/P EST LOW 20 MIN: CPT

## 2021-01-04 PROCEDURE — 99072 ADDL SUPL MATRL&STAF TM PHE: CPT

## 2021-02-12 ENCOUNTER — NON-APPOINTMENT (OUTPATIENT)
Age: 76
End: 2021-02-12

## 2021-02-16 ENCOUNTER — APPOINTMENT (OUTPATIENT)
Dept: INTERNAL MEDICINE | Facility: CLINIC | Age: 76
End: 2021-02-16
Payer: MEDICARE

## 2021-02-16 VITALS
BODY MASS INDEX: 25.95 KG/M2 | HEART RATE: 96 BPM | OXYGEN SATURATION: 98 % | SYSTOLIC BLOOD PRESSURE: 128 MMHG | WEIGHT: 152 LBS | DIASTOLIC BLOOD PRESSURE: 70 MMHG | HEIGHT: 64 IN

## 2021-02-16 DIAGNOSIS — Z00.00 ENCOUNTER FOR GENERAL ADULT MEDICAL EXAMINATION W/OUT ABNORMAL FINDINGS: ICD-10-CM

## 2021-02-16 DIAGNOSIS — E83.52 HYPERCALCEMIA: ICD-10-CM

## 2021-02-16 DIAGNOSIS — R94.5 ABNORMAL RESULTS OF LIVER FUNCTION STUDIES: ICD-10-CM

## 2021-02-16 DIAGNOSIS — D75.1 SECONDARY POLYCYTHEMIA: ICD-10-CM

## 2021-02-16 PROCEDURE — 99072 ADDL SUPL MATRL&STAF TM PHE: CPT

## 2021-02-16 PROCEDURE — G0439: CPT

## 2021-02-16 PROCEDURE — G0444 DEPRESSION SCREEN ANNUAL: CPT

## 2021-02-16 RX ORDER — CIPROFLOXACIN HYDROCHLORIDE 250 MG/1
250 TABLET, FILM COATED ORAL
Qty: 10 | Refills: 0 | Status: COMPLETED | COMMUNITY
Start: 2021-01-13

## 2021-02-16 RX ORDER — PHENAZOPYRIDINE HYDROCHLORIDE 100 MG/1
100 TABLET ORAL
Qty: 10 | Refills: 0 | Status: COMPLETED | COMMUNITY
Start: 2021-01-13

## 2021-02-16 NOTE — PHYSICAL EXAM
[Normal] : no acute distress, well nourished, well developed and well-appearing [Normal Sclera/Conjunctiva] : normal sclera/conjunctiva [PERRL] : pupils equal round and reactive to light [EOMI] : extraocular movements intact [Normal Outer Ear/Nose] : the outer ears and nose were normal in appearance [No Lymphadenopathy] : no lymphadenopathy [Supple] : supple [No Respiratory Distress] : no respiratory distress  [Clear to Auscultation] : lungs were clear to auscultation bilaterally [Normal Rate] : normal rate  [Regular Rhythm] : with a regular rhythm [Normal S1, S2] : normal S1 and S2 [Pedal Pulses Present] : the pedal pulses are present [No Edema] : there was no peripheral edema [Normal Appearance] : normal in appearance [No Masses] : no palpable masses [No Axillary Lymphadenopathy] : no axillary lymphadenopathy [Soft] : abdomen soft [Non Tender] : non-tender [Normal Bowel Sounds] : normal bowel sounds [Normal Axillary Nodes] : no axillary lymphadenopathy [Normal Anterior Cervical Nodes] : no anterior cervical lymphadenopathy [No CVA Tenderness] : no CVA  tenderness [No Spinal Tenderness] : no spinal tenderness [Kyphosis] : kyphosis [No Joint Swelling] : no joint swelling [Grossly Normal Strength/Tone] : grossly normal strength/tone [No Rash] : no rash [Coordination Grossly Intact] : coordination grossly intact [No Focal Deficits] : no focal deficits [Deep Tendon Reflexes (DTR)] : deep tendon reflexes were 2+ and symmetric [Normal Affect] : the affect was normal [de-identified] : No skin changes

## 2021-02-16 NOTE — HEALTH RISK ASSESSMENT
[4 or more  times a week (4 pts)] : 4 or more  times a week (4 points) [1 or 2 (0 pts)] : 1 or 2 (0 points) [No falls in past year] : Patient reported no falls in the past year [0] : 2) Feeling down, depressed, or hopeless: Not at all (0) [No Retinopathy] : No retinopathy [Patient reported mammogram was normal] : Patient reported mammogram was normal [Patient reported bone density results were normal] : Patient reported bone density results were normal [Patient reported colonoscopy was normal] : Patient reported colonoscopy was normal [HIV test declined] : HIV test declined [Hepatitis C test declined] : Hepatitis C test declined [None] : None [With Family] : lives with family [Retired] : retired [] :  [Feels Safe at Home] : Feels safe at home [Fully functional (bathing, dressing, toileting, transferring, walking, feeding)] : Fully functional (bathing, dressing, toileting, transferring, walking, feeding) [Fully functional (using the telephone, shopping, preparing meals, housekeeping, doing laundry, using] : Fully functional and needs no help or supervision to perform IADLs (using the telephone, shopping, preparing meals, housekeeping, doing laundry, using transportation, managing medications and managing finances) [Smoke Detector] : smoke detector [Carbon Monoxide Detector] : carbon monoxide detector [Seat Belt] :  uses seat belt [With Patient/Caregiver] : With Patient/Caregiver [Name: ___] : Health Care Proxy's Name: [unfilled]  [Relationship: ___] : Relationship: [unfilled] [] : No [de-identified] : walking  and stairs [de-identified] : varied [EyeExamDate] : 01/18 [Sexually Active] : not sexually active [Reports changes in hearing] : Reports no changes in hearing [Reports changes in vision] : Reports no changes in vision [MammogramDate] : 07/20 [MammogramComments] : NRAD [BoneDensityDate] : 07/19 [ColonoscopyDate] : 10/15 [AdvancecareDate] : 02/21

## 2021-02-16 NOTE — DISCUSSION/SUMMARY
[Subsequent Annual Wellness] : Subsequent Annual Wellness Visit [Preventive Exam & Counseling Completed] : with preventive exam as well as age and risk appropriate counseling completed [Screening Current] : screening is current [Screening Not Indicated] : screening not indicated [Ophthalmologist Referral] : ophthalmologist referral [Patient Declines] : the patient declines screening

## 2021-02-16 NOTE — ASSESSMENT
[FreeTextEntry1] : refusing bloodwork - will have faxed from Onc\par DEXA improved as per patient- declines repeat\par agrees to US abd\par She will make appt with Ophthalmologist and Derm\par \par Annual alcohol screen completed this visit. Alcohol use within healthy limits.  Healthy drinking guidelines shared with patient (Male no more than 4 SSD on any day, no more than 14 SSD per week; Female and male overage 65 no more than 3 SSD on any day, no more than 7 per week). Positive reinforcement provided given patient currently within healthy guidelines. \par \par Annual depression screen completed this visit and reviewed.  Screening not suggestive of diagnosis of MDD. \par \par Discussed advanced directives, Health Care Proxy, and goals of care during visit. \par

## 2021-02-26 ENCOUNTER — NON-APPOINTMENT (OUTPATIENT)
Age: 76
End: 2021-02-26

## 2021-03-12 ENCOUNTER — NON-APPOINTMENT (OUTPATIENT)
Age: 76
End: 2021-03-12

## 2021-05-31 ENCOUNTER — RX RENEWAL (OUTPATIENT)
Age: 76
End: 2021-05-31

## 2021-09-11 ENCOUNTER — RX RENEWAL (OUTPATIENT)
Age: 76
End: 2021-09-11

## 2021-12-06 ENCOUNTER — APPOINTMENT (OUTPATIENT)
Dept: DERMATOLOGY | Facility: CLINIC | Age: 76
End: 2021-12-06
Payer: MEDICARE

## 2021-12-06 DIAGNOSIS — D48.5 NEOPLASM OF UNCERTAIN BEHAVIOR OF SKIN: ICD-10-CM

## 2021-12-06 PROCEDURE — 99213 OFFICE O/P EST LOW 20 MIN: CPT

## 2022-01-12 ENCOUNTER — APPOINTMENT (OUTPATIENT)
Dept: INTERNAL MEDICINE | Facility: CLINIC | Age: 77
End: 2022-01-12
Payer: MEDICARE

## 2022-01-12 VITALS
RESPIRATION RATE: 14 BRPM | DIASTOLIC BLOOD PRESSURE: 70 MMHG | HEIGHT: 64 IN | WEIGHT: 150 LBS | OXYGEN SATURATION: 98 % | HEART RATE: 82 BPM | SYSTOLIC BLOOD PRESSURE: 130 MMHG | BODY MASS INDEX: 25.61 KG/M2

## 2022-01-12 DIAGNOSIS — E83.51 HYPOCALCEMIA: ICD-10-CM

## 2022-01-12 DIAGNOSIS — N20.0 CALCULUS OF KIDNEY: ICD-10-CM

## 2022-01-12 DIAGNOSIS — N30.01 ACUTE CYSTITIS WITH HEMATURIA: ICD-10-CM

## 2022-01-12 DIAGNOSIS — R82.81 PYURIA: ICD-10-CM

## 2022-01-12 PROCEDURE — 99214 OFFICE O/P EST MOD 30 MIN: CPT

## 2022-01-12 RX ORDER — POTASSIUM CITRATE 10 MEQ/1
10 MEQ TABLET, EXTENDED RELEASE ORAL
Qty: 300 | Refills: 0 | Status: DISCONTINUED | COMMUNITY
Start: 2021-08-03 | End: 2022-01-12

## 2022-01-12 RX ORDER — CALCIPOTRIENE 50 UG/G
0.01 CREAM TOPICAL TWICE DAILY
Qty: 1 | Refills: 1 | Status: DISCONTINUED | COMMUNITY
Start: 2020-02-24 | End: 2022-01-12

## 2022-01-12 RX ORDER — POTASSIUM CITRATE 15 MEQ/1
15 MEQ TABLET, EXTENDED RELEASE ORAL
Qty: 60 | Refills: 0 | Status: ACTIVE | COMMUNITY

## 2022-01-12 RX ORDER — CIPROFLOXACIN HYDROCHLORIDE 500 MG/1
500 TABLET, FILM COATED ORAL
Qty: 5 | Refills: 0 | Status: DISCONTINUED | COMMUNITY
Start: 2021-07-22 | End: 2022-01-12

## 2022-01-13 PROBLEM — N20.0 KIDNEY STONE: Status: ACTIVE | Noted: 2019-06-04

## 2022-01-13 RX ORDER — NAPROXEN SODIUM 220 MG
220 TABLET ORAL
Refills: 0 | Status: DISCONTINUED | COMMUNITY
End: 2022-01-13

## 2022-01-13 RX ORDER — CETIRIZINE HYDROCHLORIDE 10 MG/1
10 TABLET, FILM COATED ORAL
Refills: 0 | Status: DISCONTINUED | COMMUNITY
End: 2022-01-13

## 2022-01-13 NOTE — PHYSICAL EXAM
[No Acute Distress] : no acute distress [Well-Appearing] : well-appearing [Normal Sclera/Conjunctiva] : normal sclera/conjunctiva [PERRL] : pupils equal round and reactive to light [EOMI] : extraocular movements intact [Normal Outer Ear/Nose] : the outer ears and nose were normal in appearance [Normal Oropharynx] : the oropharynx was normal [Normal TMs] : both tympanic membranes were normal [No JVD] : no jugular venous distention [No Lymphadenopathy] : no lymphadenopathy [Supple] : supple [Thyroid Normal, No Nodules] : the thyroid was normal and there were no nodules present [No Respiratory Distress] : no respiratory distress  [No Accessory Muscle Use] : no accessory muscle use [Clear to Auscultation] : lungs were clear to auscultation bilaterally [Normal Rate] : normal rate  [Regular Rhythm] : with a regular rhythm [Normal S1, S2] : normal S1 and S2 [No Murmur] : no murmur heard [No Carotid Bruits] : no carotid bruits [No Abdominal Bruit] : a ~M bruit was not heard ~T in the abdomen [Pedal Pulses Present] : the pedal pulses are present [No Edema] : there was no peripheral edema [Soft] : abdomen soft [Non Tender] : non-tender [Non-distended] : non-distended [No Masses] : no abdominal mass palpated [No HSM] : no HSM [Normal Bowel Sounds] : normal bowel sounds [Normal Posterior Cervical Nodes] : no posterior cervical lymphadenopathy [Normal Anterior Cervical Nodes] : no anterior cervical lymphadenopathy [No CVA Tenderness] : no CVA  tenderness [No Spinal Tenderness] : no spinal tenderness [No Joint Swelling] : no joint swelling [Grossly Normal Strength/Tone] : grossly normal strength/tone [No Rash] : no rash [Coordination Grossly Intact] : coordination grossly intact [No Focal Deficits] : no focal deficits [Normal Gait] : normal gait [Deep Tendon Reflexes (DTR)] : deep tendon reflexes were 2+ and symmetric [Normal Affect] : the affect was normal [Normal Insight/Judgement] : insight and judgment were intact

## 2022-01-14 PROBLEM — E83.51 HYPOCALCEMIA: Status: ACTIVE | Noted: 2022-01-14

## 2022-01-15 ENCOUNTER — NON-APPOINTMENT (OUTPATIENT)
Age: 77
End: 2022-01-15

## 2022-01-15 PROBLEM — N30.01 ACUTE CYSTITIS WITH HEMATURIA: Status: ACTIVE | Noted: 2022-01-15

## 2022-01-15 PROBLEM — R82.81 PYURIA: Status: RESOLVED | Noted: 2022-01-15 | Resolved: 2022-02-14

## 2022-01-15 LAB
ANION GAP SERPL CALC-SCNC: 15 MMOL/L
APPEARANCE: CLEAR
BACTERIA: NEGATIVE
BILIRUBIN URINE: NEGATIVE
BLOOD URINE: ABNORMAL
BUN SERPL-MCNC: 28 MG/DL
CALCIUM SERPL-MCNC: 8.3 MG/DL
CHLORIDE SERPL-SCNC: 106 MMOL/L
CO2 SERPL-SCNC: 21 MMOL/L
COLOR: NORMAL
CREAT SERPL-MCNC: 1.17 MG/DL
GLUCOSE QUALITATIVE U: NEGATIVE
GLUCOSE SERPL-MCNC: 60 MG/DL
HYALINE CASTS: 3 /LPF
KETONES URINE: NEGATIVE
LEUKOCYTE ESTERASE URINE: ABNORMAL
MICROSCOPIC-UA: NORMAL
NITRITE URINE: NEGATIVE
PH URINE: 6.5
POTASSIUM SERPL-SCNC: 4.3 MMOL/L
PROTEIN URINE: NORMAL
RED BLOOD CELLS URINE: 9 /HPF
SODIUM SERPL-SCNC: 143 MMOL/L
SPECIFIC GRAVITY URINE: 1.02
SQUAMOUS EPITHELIAL CELLS: 1 /HPF
UROBILINOGEN URINE: NORMAL
WHITE BLOOD CELLS URINE: 7 /HPF

## 2022-01-15 RX ORDER — CIPROFLOXACIN HYDROCHLORIDE 500 MG/1
500 TABLET, FILM COATED ORAL
Qty: 14 | Refills: 0 | Status: ACTIVE | COMMUNITY
Start: 2022-01-15 | End: 1900-01-01

## 2022-01-16 LAB
ALBUMIN SERPL ELPH-MCNC: 4.3 G/DL
BACTERIA UR CULT: NORMAL
CA-I SERPL-SCNC: 0.91 MMOL/L

## 2022-01-17 ENCOUNTER — NON-APPOINTMENT (OUTPATIENT)
Age: 77
End: 2022-01-17

## 2022-01-17 NOTE — HISTORY OF PRESENT ILLNESS
[(Patient denies any chest pain, claudication, dyspnea on exertion, orthopnea, palpitations or syncope)] : Patient denies any chest pain, claudication, dyspnea on exertion, orthopnea, palpitations or syncope [Moderate (4-6 METs)] : Moderate (4-6 METs) [Aortic Stenosis] : no aortic stenosis [Atrial Fibrillation] : no atrial fibrillation [Coronary Artery Disease] : no coronary artery disease [Recent Myocardial Infarction] : no recent myocardial infarction [Implantable Device/Pacemaker] : no implantable device/pacemaker [Asthma] : no asthma [COPD] : no COPD [Sleep Apnea] : no sleep apnea [Smoker] : not a smoker [Family Member] : no family member with adverse anesthesia reaction/sudden death [Self] : no previous adverse anesthesia reaction [Chronic Anticoagulation] : no chronic anticoagulation [Chronic Kidney Disease] : no chronic kidney disease [Diabetes] : no diabetes [FreeTextEntry1] : ureteroscopy and laser lithotripsy [FreeTextEntry2] : 1/26/22 [FreeTextEntry3] : Dr. Meyers, [facility], [facility], Fax  [FreeTextEntry4] : SONYA MENDEZ  is a 76 year old female  with history of  kidney stone, HTN, HLD, emphysema, breast cancer , s/p lumpectomy,  Hyperparathyroidism, s/p parathyroidectomy, osteoarthritis, pancreatic cyst, Actinic Keratosis, BCC L nose, osteoporosis, LE venous stasis  presented today for pre-op medical clearance.\par \par Pt is a retired  for [employer], now stays busy with her hobby doing [employer] work. Pt was noted with kidney stone and got first time surgery in 5/2019 and been f/u with urology  Dr. Harjinder Melo q 6 months. This is her 3rd time surgery for kidney stone removal.\par \par Taking Anastrozole since 2012 for 10 years. Pt stated that her oncologist did not stop her anastrozole in the previous 2 similar surgeries. Taking PM potassium citrate. Hx of Calcium oxalate and Calcium phosphate stone. hx ureteroscopy and laser lithotripsy of 15mm and 7mm stones 11/19/2020. 11/30/21 CT: left UVJx2, 6mm and 4mm associated with moderate hydronephrosis, b/l renal calculi, on tamsulosin but did not passed any stones, no colic pain.\par \par To get pre-op test this afternoon at [facility] and will fax the result to us. Denied gross hematuria or colic pain.\par No fever, chills, CP, SOB, leg swelling, n/v/c/d, abdominal pain. [FreeTextEntry7] : EC20: NSR 90 bpm\par None within 5 years.

## 2022-01-17 NOTE — ASSESSMENT
[Patient Optimized for Surgery] : Patient optimized for surgery [No Further Testing Recommended] : no further testing recommended [Continue medications as is] : Continue current medications [As per surgery] : as per surgery [High Risk Surgery - Intraperitoneal, Intrathoracic or Supringuinal Vascular Procedures] : High Risk Surgery - Intraperitoneal, Intrathoracic or Supringuinal Vascular Procedures - No (0) [Ischemic Heart Disease] : Ischemic Heart Disease - No (0) [Congestive Heart Failure] : Congestive Heart Failure - No (0) [Prior Cerebrovascular Accident or TIA] : Prior Cerebrovascular Accident or TIA - No (0) [Creatinine >= 2mg/dL (1 Point)] : Creatinine >= 2mg/dL - No (0) [Insulin-dependent Diabetic (1 Point)] : Insulin-dependent Diabetic - No (0) [0] : 0 , RCRI Class: I, Risk of Post-Op Cardiac Complications: 3.9%, 95% CI for Risk Estimate: 2.8% - 5.4% [FreeTextEntry4] : \par 1. Empirical ABT use for UTI and discontinued.\par 1/15/22 UA result reviewed: Leukocyte moderate, Nitrate negative, U WBC 7, U RBC 9, blood +, Bacteria: negative, Urine culture pending. D/W Dr. Goncalves. Pt is asymptomatic but planned for laser lithotripsy on 1/19/22. Started empirical ABT Cipro 500mg po bid.\par 1/17/22 urine culture came back as negative and pt remains symptoms free. Denied gross hematuria, dysuria or frequency. Stopped Cipro at day 3. \par \par 2. mild hypocalcemia\par 1/15/22 Repeated Serum calcium was borderline low as 8.4. Added order for corrected calcium for albumin level and it's pending. Started Calcium 600+Vit D 1tab po bid as supplement.\par 1/17/22 ionized calcium 0.91mmol/L, Serum albumin 4.3g/dL, corrected calcium was calculated as 1.6mml/L. It is over 0.8mml/L, so no IV calcium needed. Pt has been taking oral calcium and Vitamin D without CP, SOB, carpopedal spasm, or seizure. Continue take oral  calcium and Vit D bid.\par \par 3. On 1/15/22 I called Urology  and informed on call Dr. Leong for the pending decision of medical clearance. They asked me to call on Monday again with final decision for the clearance.\par 1/17/22 Pt is Medically optimized for the laser lithotripsy on this Wed 1/19/22. Sent relevant document to Fax #[phone] [facility]. Called [facility] office , Urology  and left a message that we've cleared pt for surgery. \par \par 4. Pt has been stable after hyperparathyroidism and s/p parathyroidectomy. Will refer to Endo for full work up for the etiology of new hypocalcemia.\par \par 5. Pt should contact her oncologist for the use of Anastrozole before surgery.\par \par ###  Pt is low risk candidate for low risk procedure with no further w/up required prior to planned surgery and no contraindication to proceeding with planned surgery. \par \par  [FreeTextEntry7] : Do not take Aspirin, NSAID( Motrin, Ibuprofen etc.), Herb, supplement 7 days prior to surgery.

## 2022-01-17 NOTE — RESULTS/DATA
[] : results reviewed [de-identified] : no acute pulmonary disease. stable with emphysema [de-identified] : 1/12/22 [facility] lab reviewed.\par CBC: results reviewed, within normal  range \par Chemistries: results reviewed, slightly increased BUN= 33 and Creatinine =1.24 and  BUN/Cr ratio=27 c/w kidney stone,  electrolyte normal, borderline low serum calcium 8.1\par 1/14/22 Repeated lab for hypocalcemia and pyuria.\par \par UA: large urine blood, UA nitrate negative, UA leukocytes trace c/w kidney stone\par ECG: results reviewed, NSR, HR 86BPM, nl axis/intervals, no acute ST/T changes

## 2022-02-07 RX ORDER — GINGER ROOT/GINGER ROOT EXT 262.5 MG
600-800 CAPSULE ORAL
Qty: 60 | Refills: 0 | Status: ACTIVE | COMMUNITY
Start: 2022-01-15 | End: 1900-01-01

## 2022-05-27 ENCOUNTER — RX RENEWAL (OUTPATIENT)
Age: 77
End: 2022-05-27

## 2022-06-06 ENCOUNTER — APPOINTMENT (OUTPATIENT)
Dept: DERMATOLOGY | Facility: CLINIC | Age: 77
End: 2022-06-06
Payer: MEDICARE

## 2022-06-06 DIAGNOSIS — I87.2 VENOUS INSUFFICIENCY (CHRONIC) (PERIPHERAL): ICD-10-CM

## 2022-06-06 DIAGNOSIS — L30.9 DERMATITIS, UNSPECIFIED: ICD-10-CM

## 2022-06-06 PROCEDURE — 99213 OFFICE O/P EST LOW 20 MIN: CPT

## 2022-06-06 RX ORDER — CEFPODOXIME PROXETIL 100 MG/1
100 TABLET, FILM COATED ORAL
Qty: 10 | Refills: 0 | Status: ACTIVE | COMMUNITY
Start: 2022-01-19

## 2022-06-20 ENCOUNTER — RESULT CHARGE (OUTPATIENT)
Age: 77
End: 2022-06-20

## 2022-06-21 PROBLEM — M81.0 OSTEOPOROSIS: Status: ACTIVE | Noted: 2019-06-04

## 2022-06-22 ENCOUNTER — APPOINTMENT (OUTPATIENT)
Dept: INTERNAL MEDICINE | Facility: CLINIC | Age: 77
End: 2022-06-22
Payer: MEDICARE

## 2022-06-22 ENCOUNTER — NON-APPOINTMENT (OUTPATIENT)
Age: 77
End: 2022-06-22

## 2022-06-22 VITALS
OXYGEN SATURATION: 98 % | DIASTOLIC BLOOD PRESSURE: 80 MMHG | HEIGHT: 64 IN | WEIGHT: 169 LBS | HEART RATE: 78 BPM | SYSTOLIC BLOOD PRESSURE: 150 MMHG | BODY MASS INDEX: 28.85 KG/M2

## 2022-06-22 DIAGNOSIS — M81.0 AGE-RELATED OSTEOPOROSIS W/OUT CURRENT PATHOLOGICAL FRACTURE: ICD-10-CM

## 2022-06-22 PROCEDURE — G0442 ANNUAL ALCOHOL SCREEN 15 MIN: CPT

## 2022-06-22 PROCEDURE — G0439: CPT

## 2022-06-22 PROCEDURE — G0444 DEPRESSION SCREEN ANNUAL: CPT | Mod: 59

## 2022-06-26 LAB
25(OH)D3 SERPL-MCNC: 56.2 NG/ML
ALBUMIN SERPL ELPH-MCNC: 4.3 G/DL
ALP BLD-CCNC: 108 U/L
ALT SERPL-CCNC: 11 U/L
ANION GAP SERPL CALC-SCNC: 11 MMOL/L
AST SERPL-CCNC: 21 U/L
BASOPHILS # BLD AUTO: 0.04 K/UL
BASOPHILS NFR BLD AUTO: 0.7 %
BILIRUB SERPL-MCNC: 0.5 MG/DL
BUN SERPL-MCNC: 27 MG/DL
CALCIUM SERPL-MCNC: 9.5 MG/DL
CHLORIDE SERPL-SCNC: 102 MMOL/L
CHOLEST SERPL-MCNC: 219 MG/DL
CO2 SERPL-SCNC: 27 MMOL/L
CREAT SERPL-MCNC: 0.98 MG/DL
EGFR: 59 ML/MIN/1.73M2
EOSINOPHIL # BLD AUTO: 0.28 K/UL
EOSINOPHIL NFR BLD AUTO: 4.6 %
ESTIMATED AVERAGE GLUCOSE: 114 MG/DL
GLUCOSE SERPL-MCNC: 93 MG/DL
HBA1C MFR BLD HPLC: 5.6 %
HCT VFR BLD CALC: 46.9 %
HDLC SERPL-MCNC: 83 MG/DL
HGB BLD-MCNC: 14.8 G/DL
IMM GRANULOCYTES NFR BLD AUTO: 0.2 %
LDLC SERPL CALC-MCNC: 105 MG/DL
LYMPHOCYTES # BLD AUTO: 1.68 K/UL
LYMPHOCYTES NFR BLD AUTO: 27.4 %
MAN DIFF?: NORMAL
MCHC RBC-ENTMCNC: 31.6 GM/DL
MCHC RBC-ENTMCNC: 33.2 PG
MCV RBC AUTO: 105.2 FL
MONOCYTES # BLD AUTO: 0.56 K/UL
MONOCYTES NFR BLD AUTO: 9.1 %
NEUTROPHILS # BLD AUTO: 3.57 K/UL
NEUTROPHILS NFR BLD AUTO: 58 %
NONHDLC SERPL-MCNC: 136 MG/DL
PLATELET # BLD AUTO: 256 K/UL
POTASSIUM SERPL-SCNC: 5 MMOL/L
PROT SERPL-MCNC: 6.8 G/DL
RBC # BLD: 4.46 M/UL
RBC # FLD: 11.9 %
SODIUM SERPL-SCNC: 140 MMOL/L
TRIGL SERPL-MCNC: 153 MG/DL
TSH SERPL-ACNC: 0.89 UIU/ML
WBC # FLD AUTO: 6.14 K/UL

## 2022-06-26 NOTE — HEALTH RISK ASSESSMENT
[Patient reported bone density results were normal] : Patient reported bone density results were normal [Patient reported colonoscopy was normal] : Patient reported colonoscopy was normal [Never] : Never [4 or more  times a week (4 pts)] : 4 or more  times a week (4 points) [1 or 2 (0 pts)] : 1 or 2 (0 points) [Never (0 pts)] : Never (0 points) [Yes] : Yes [No falls in past year] : Patient reported no falls in the past year [0] : 2) Feeling down, depressed, or hopeless: Not at all (0) [PHQ-2 Negative - No further assessment needed] : PHQ-2 Negative - No further assessment needed [No Retinopathy] : No retinopathy [Patient reported mammogram was normal] : Patient reported mammogram was normal [Patient declined PAP Smear] : Patient declined PAP Smear [HIV test declined] : HIV test declined [Hepatitis C test declined] : Hepatitis C test declined [None] : None [With Family] : lives with family [Retired] : retired [] :  [Sexually Active] : sexually active [Feels Safe at Home] : Feels safe at home [Fully functional (bathing, dressing, toileting, transferring, walking, feeding)] : Fully functional (bathing, dressing, toileting, transferring, walking, feeding) [Fully functional (using the telephone, shopping, preparing meals, housekeeping, doing laundry, using] : Fully functional and needs no help or supervision to perform IADLs (using the telephone, shopping, preparing meals, housekeeping, doing laundry, using transportation, managing medications and managing finances) [Smoke Detector] : smoke detector [Carbon Monoxide Detector] : carbon monoxide detector [Seat Belt] :  uses seat belt [Designated Healthcare Proxy] : Designated healthcare proxy [Name: ___] : Health Care Proxy's Name: [unfilled]  [Relationship: ___] : Relationship: [unfilled] [Audit-CScore] : 4 [de-identified] : walking [de-identified] : regular varied [[id number]] : 0 [EyeExamDate] : 2 years ago [Change in mental status noted] : No change in mental status noted [Reports changes in hearing] : Reports no changes in hearing [Reports changes in vision] : Reports no changes in vision [MammogramDate] : 07/21 [MammogramComments] : sees Dr Vidal/Dr Roberson (Onc) [BoneDensityDate] : 07/20 [BoneDensityComments] : normal by her report - monitored by onc [ColonoscopyDate] : 10/15 [AdvancecareDate] : 06/22

## 2022-06-26 NOTE — HISTORY OF PRESENT ILLNESS
[de-identified] : 78 yo F with h/o HTN, HLD, breast Ca s/p lumpectomy/XRT, primary hyperparathyroid s/o partial parathyroid resection, osteoporosis, pancreatic cyst, nephrolithiasis. .\par \par started sx of URI last Friday - cough, stuffy nose, pnd, no fever or myalgias - \par s/p 2 covid shots and one booster.\par \par RE pancreatic cyst: identified by US 3/21 as having increased in size to 1.8 x 1.1 x 1.6. . d/w Ms CHRISTOPHER at the time and she elected to have her gastroenterologist do any further assessment and tx, Dr Tuyet Yu.. Ms CHRISTOPHER reports she had bx last yr and this year may have repeat bx in August to recheck. \par RE HTN: generally well controlled\par RE osteoporosis: s/p alendronate x 5 years however only took 35 mg Qweek - She tells me that as per her most recent DEXA - osteopenia and improving. not currently on any meds\par RE nephrolithiasis: s/p recent lithotripsy x 3 - has CT with contrast scheduled for September.\par

## 2022-06-26 NOTE — ASSESSMENT
[FreeTextEntry1] : 78 yo F with h/o HTN, HLD, breast Ca s/p lumpectomy/XRT, primary hyperparathyroid s/o partial parathyroid resection, osteoporosis, pancreatic cyst, nephrolithiasis. .\par \par RE URI sx: started last Friday - cough, stuffy nose, pnd, no fever or myalgias - \par s/p 2 covid shots and one booster.\par checking rapid COVID test 9still in window for paxlovid if she tests positive.\par RE pancreatic cyst: identified by US 3/21 as having increased in size to 1.8 x 1.1 x 1.6. . d/w Ms CHRISTOPHER at the time and she elected to have her gastroenterologist do any further assessment and tx, Dr Tuyet Yu.. Ms CHRISTOPHER reports she had bx last yr and this year may have repeat bx in August to recheck. \par RE HTN: generally well controlled\par RE osteoporosis: s/p alendronate x 5 years however only took 35 mg Qweek - She tells me that as per her most recent DEXA - osteopenia and improving. not currently on any meds\par RE nephrolithiasis: s/p recent lithotripsy x 3 - has CT with contrast scheduled for September.\par \par Annual alcohol screen completed this visit. Alcohol use within healthy limits.  Healthy drinking guidelines shared with patient (Female and male overage 65 no more than 3 SSD on any day, no more than 7 per week). Positive reinforcement provided given patient currently within healthy guidelines.\par \par Annual depression screen completed this visit.  PHQ 9 completed and reviewed.  Screening not suggestive of diagnosis of MDD. \par \par Discussed advanced directives, Health Care Proxy, and goals of care during visit. \par \par ADDM: Rapid COVID test negative

## 2022-06-26 NOTE — PHYSICAL EXAM
[Normal] : no acute distress, well nourished, well developed and well-appearing [Normal Sclera/Conjunctiva] : normal sclera/conjunctiva [PERRL] : pupils equal round and reactive to light [EOMI] : extraocular movements intact [Normal Outer Ear/Nose] : the outer ears and nose were normal in appearance [No Lymphadenopathy] : no lymphadenopathy [Supple] : supple [No Respiratory Distress] : no respiratory distress  [No Accessory Muscle Use] : no accessory muscle use [Clear to Auscultation] : lungs were clear to auscultation bilaterally [Normal Rate] : normal rate  [Regular Rhythm] : with a regular rhythm [Normal S1, S2] : normal S1 and S2 [Pedal Pulses Present] : the pedal pulses are present [No Edema] : there was no peripheral edema [Normal Appearance] : normal in appearance [No Masses] : no palpable masses [No Axillary Lymphadenopathy] : no axillary lymphadenopathy [Soft] : abdomen soft [Non Tender] : non-tender [Normal Bowel Sounds] : normal bowel sounds [Normal Axillary Nodes] : no axillary lymphadenopathy [Normal Anterior Cervical Nodes] : no anterior cervical lymphadenopathy [No CVA Tenderness] : no CVA  tenderness [No Spinal Tenderness] : no spinal tenderness [Kyphosis] : kyphosis [No Joint Swelling] : no joint swelling [Grossly Normal Strength/Tone] : grossly normal strength/tone [No Rash] : no rash [Coordination Grossly Intact] : coordination grossly intact [No Focal Deficits] : no focal deficits [Deep Tendon Reflexes (DTR)] : deep tendon reflexes were 2+ and symmetric [Normal Affect] : the affect was normal [de-identified] : no sinus tenderness [de-identified] : no neck tenderness [de-identified] : well healed surgical scar

## 2022-06-26 NOTE — DISCUSSION/SUMMARY
[Subsequent Annual Wellness] : Subsequent Annual Wellness Visit [Preventive Exam & Counseling Completed] : with preventive exam as well as age and risk appropriate counseling completed [EKG] : EKG recommended [Healthy Diet] : counseling was given on maintaining a healthy diet [Healthy Weight] : counseling was given on maintaining a healthy weight [Blood Glucose] : due for blood glucose [Screening Current] : screening is current [Calcium and Vitamin D Intake] : counseling was given on obtaining adequate amounts of calcium and vitamin D on a daily basis [DXA Axial] : due for DXA axial skeleton [Screening Not Indicated] : screening not indicated [Ophthalmologist Referral] : ophthalmologist referral [Patient Declines] : the patient declines screening [Influenza Recommended Annually] : influenza vaccination is recommended annually [Lifetime Vaccine Completed] : the lifetime pneumococcal vaccine has been completed [Complete and Up to Date] : complete and up to date [Encouraged to F/U to Discuss Questions/Decisions] : ~he/she~ was encouraged to follow-up with me to discuss ~his/her~ questions and/or decisions [Follow-Up in ___] : follow-up visit needed in [unfilled] [FreeTextEntry4] : she prefers to get rx from her oncologist

## 2022-09-06 ENCOUNTER — RX RENEWAL (OUTPATIENT)
Age: 77
End: 2022-09-06

## 2022-11-02 ENCOUNTER — OUTPATIENT (OUTPATIENT)
Dept: OUTPATIENT SERVICES | Facility: HOSPITAL | Age: 77
LOS: 1 days | Discharge: HOME | End: 2022-11-02

## 2022-11-02 DIAGNOSIS — N20.0 CALCULUS OF KIDNEY: Chronic | ICD-10-CM

## 2022-11-02 DIAGNOSIS — D64.9 ANEMIA, UNSPECIFIED: ICD-10-CM

## 2022-11-02 DIAGNOSIS — Z98.89 OTHER SPECIFIED POSTPROCEDURAL STATES: Chronic | ICD-10-CM

## 2022-11-02 DIAGNOSIS — Z98.890 OTHER SPECIFIED POSTPROCEDURAL STATES: Chronic | ICD-10-CM

## 2023-02-04 ENCOUNTER — OUTPATIENT (OUTPATIENT)
Dept: OUTPATIENT SERVICES | Facility: HOSPITAL | Age: 78
LOS: 1 days | Discharge: HOME | End: 2023-02-04

## 2023-02-04 DIAGNOSIS — N20.0 CALCULUS OF KIDNEY: Chronic | ICD-10-CM

## 2023-02-04 DIAGNOSIS — Z98.89 OTHER SPECIFIED POSTPROCEDURAL STATES: Chronic | ICD-10-CM

## 2023-02-04 DIAGNOSIS — Z98.890 OTHER SPECIFIED POSTPROCEDURAL STATES: Chronic | ICD-10-CM

## 2023-02-04 DIAGNOSIS — D64.9 ANEMIA, UNSPECIFIED: ICD-10-CM

## 2023-02-13 ENCOUNTER — APPOINTMENT (OUTPATIENT)
Dept: HEMATOLOGY ONCOLOGY | Facility: CLINIC | Age: 78
End: 2023-02-13
Payer: MEDICARE

## 2023-02-13 ENCOUNTER — RESULT REVIEW (OUTPATIENT)
Age: 78
End: 2023-02-13

## 2023-02-13 ENCOUNTER — APPOINTMENT (OUTPATIENT)
Dept: HEMATOLOGY ONCOLOGY | Facility: CLINIC | Age: 78
End: 2023-02-13

## 2023-02-13 VITALS
TEMPERATURE: 97.2 F | DIASTOLIC BLOOD PRESSURE: 83 MMHG | WEIGHT: 167.55 LBS | SYSTOLIC BLOOD PRESSURE: 142 MMHG | HEART RATE: 91 BPM | RESPIRATION RATE: 15 BRPM | BODY MASS INDEX: 28.76 KG/M2 | OXYGEN SATURATION: 97 %

## 2023-02-13 LAB
25(OH)D3 SERPL-MCNC: 73.6 NG/ML
ALBUMIN SERPL ELPH-MCNC: 4.4 G/DL
ALP BLD-CCNC: 112 U/L
ALT SERPL-CCNC: 20 U/L
ANION GAP SERPL CALC-SCNC: 16 MMOL/L
AST SERPL-CCNC: 29 U/L
BASOPHILS # BLD AUTO: 0.08 K/UL — SIGNIFICANT CHANGE UP (ref 0–0.2)
BASOPHILS NFR BLD AUTO: 1.7 % — SIGNIFICANT CHANGE UP (ref 0–2)
BILIRUB SERPL-MCNC: 0.8 MG/DL
BUN SERPL-MCNC: 27 MG/DL
CALCIUM SERPL-MCNC: 9.7 MG/DL
CHLORIDE SERPL-SCNC: 102 MMOL/L
CO2 SERPL-SCNC: 24 MMOL/L
CREAT SERPL-MCNC: 0.93 MG/DL
EGFR: 63 ML/MIN/1.73M2
EOSINOPHIL # BLD AUTO: 0.38 K/UL — SIGNIFICANT CHANGE UP (ref 0–0.5)
EOSINOPHIL NFR BLD AUTO: 7.9 % — HIGH (ref 0–6)
GLUCOSE SERPL-MCNC: 117 MG/DL
HCT VFR BLD CALC: 47.1 % — HIGH (ref 34.5–45)
HGB BLD-MCNC: 15.3 G/DL — SIGNIFICANT CHANGE UP (ref 11.5–15.5)
IMM GRANULOCYTES NFR BLD AUTO: 0 % — SIGNIFICANT CHANGE UP (ref 0–0.9)
LYMPHOCYTES # BLD AUTO: 1.22 K/UL — SIGNIFICANT CHANGE UP (ref 1–3.3)
LYMPHOCYTES # BLD AUTO: 25.3 % — SIGNIFICANT CHANGE UP (ref 13–44)
MCHC RBC-ENTMCNC: 32.5 G/DL — SIGNIFICANT CHANGE UP (ref 32–36)
MCHC RBC-ENTMCNC: 33.5 PG — SIGNIFICANT CHANGE UP (ref 27–34)
MCV RBC AUTO: 103.1 FL — HIGH (ref 80–100)
MONOCYTES # BLD AUTO: 0.39 K/UL — SIGNIFICANT CHANGE UP (ref 0–0.9)
MONOCYTES NFR BLD AUTO: 8.1 % — SIGNIFICANT CHANGE UP (ref 2–14)
NEUTROPHILS # BLD AUTO: 2.76 K/UL — SIGNIFICANT CHANGE UP (ref 1.8–7.4)
NEUTROPHILS NFR BLD AUTO: 57 % — SIGNIFICANT CHANGE UP (ref 43–77)
NRBC # BLD: 0 /100 WBCS — SIGNIFICANT CHANGE UP (ref 0–0)
PLATELET # BLD AUTO: 235 K/UL — SIGNIFICANT CHANGE UP (ref 150–400)
POTASSIUM SERPL-SCNC: 4.6 MMOL/L
PROT SERPL-MCNC: 7.5 G/DL
RBC # BLD: 4.57 M/UL — SIGNIFICANT CHANGE UP (ref 3.8–5.2)
RBC # FLD: 12.6 % — SIGNIFICANT CHANGE UP (ref 10.3–14.5)
SODIUM SERPL-SCNC: 142 MMOL/L
WBC # BLD: 4.83 K/UL — SIGNIFICANT CHANGE UP (ref 3.8–10.5)
WBC # FLD AUTO: 4.83 K/UL — SIGNIFICANT CHANGE UP (ref 3.8–10.5)

## 2023-02-13 PROCEDURE — 99214 OFFICE O/P EST MOD 30 MIN: CPT

## 2023-02-13 NOTE — PHYSICAL EXAM
[Normal] : affect appropriate [de-identified] : RIght breast well healed incision and right axillary LN incision site; no palpable lesions, no nipple discharge or skin dimpling in either breast [de-identified] : No cervical, no supraclav or axillary LAD

## 2023-02-13 NOTE — HISTORY OF PRESENT ILLNESS
[Disease: _____________________] : Disease: [unfilled] [T: ___] : T[unfilled] [N: ___] : N[unfilled] [M: ___] : M[unfilled] [AJCC Stage: ____] : AJCC Stage: [unfilled] [de-identified] : Patient initially evaluated at [facility] in 2012 after she underwent lumpectomy with Dr. Marianne Rojas at [facility] for right breast invasive carcinoma. She had ER+[place]+ her2 kirk negative disease but specimen was too small to have Oncotype testing done.\par After completion of RT to the right breast she was started on endocrine therapy with anastrazole in 2/2013 without incident. During imaging she was noted to have liver lesion which ultimately was determined to be stable cyst.\par \par Has been monitored radiographically and clinically\par Transferring care from [facility] to  [facility] in 2/2023\par \par  [de-identified] : ER+AL+ Her2 -; Oncotype - unable to obtain due to small specimen size [de-identified] : 2/13/23\par All of the patient's prior records including radiology, pathology and prior notes reviewed; Past Medical History, Past Surgical History, Family History and Social history reviewed and updated in the patient's chart. \par \par Imaging with mammo/sono at [facility] in 7/2022 - negative per patient\par Bone Density 2019 showed osteoporosis; repeat in 1/2020 osteopenia (patient unsure of where she had it done) [100: Normal, no complaints, no evidence of disease.] : 100: Normal, no complaints, no evidence of disease.

## 2023-02-13 NOTE — ASSESSMENT
[FreeTextEntry1] : 76 yo woman with history of Stage 1A right breast cancer ER+MS+, her2 negative; Oncotype low risk; s/p lumpectomy in late /2012, completed RT and started adjuvant endocrine therapy 3/2013 and tolerated well.\par \par - to continue endocrine therapy for total of 10 years until 3/2023; has tolerated anastrazole without complication\par - no role for extension past 10 years of endocrine therapy\par - bone density in 2019 with endocrine (Dr. Acosta) showed osteoporosis; has not had any therapy; repeat bone denisty in 1/2020 with osteopenia (will obtain reports); overdue for repeat; new script provided\par - will obtain last mammo/sono from 7/2022 ([facility]) \par - continue follow-up with Dr. Rojas (breast surgeon)\par - continue to follow up annually at this point\par \par HEALTH MAINTENANCE SCREENING RECOMMENDATIONS\par PMD at least annually with lipid checks/bloodwork, \par GYN at least annually and PAP test screening per their recommendations, \par GI for colon cancer screening/colonoscopy at least every 10 years \par dermatology for annual skin checks  \par ophthalmology for annual eye exams

## 2023-02-16 ENCOUNTER — RX RENEWAL (OUTPATIENT)
Age: 78
End: 2023-02-16

## 2023-05-22 ENCOUNTER — RX RENEWAL (OUTPATIENT)
Age: 78
End: 2023-05-22

## 2023-06-12 ENCOUNTER — APPOINTMENT (OUTPATIENT)
Dept: DERMATOLOGY | Facility: CLINIC | Age: 78
End: 2023-06-12
Payer: MEDICARE

## 2023-06-12 DIAGNOSIS — D18.01 HEMANGIOMA OF SKIN AND SUBCUTANEOUS TISSUE: ICD-10-CM

## 2023-06-12 DIAGNOSIS — L81.4 OTHER MELANIN HYPERPIGMENTATION: ICD-10-CM

## 2023-06-12 PROCEDURE — 99214 OFFICE O/P EST MOD 30 MIN: CPT

## 2023-06-12 RX ORDER — IMIQUIMOD 50 MG/G
5 CREAM TOPICAL
Qty: 30 | Refills: 1 | Status: ACTIVE | COMMUNITY
Start: 2023-06-12 | End: 1900-01-01

## 2023-07-21 ENCOUNTER — NON-APPOINTMENT (OUTPATIENT)
Age: 78
End: 2023-07-21

## 2023-07-21 ENCOUNTER — APPOINTMENT (OUTPATIENT)
Dept: INTERNAL MEDICINE | Facility: CLINIC | Age: 78
End: 2023-07-21

## 2023-07-24 ENCOUNTER — NON-APPOINTMENT (OUTPATIENT)
Age: 78
End: 2023-07-24

## 2023-08-30 NOTE — ASU PREOP CHECKLIST - WARM FLUIDS/WARM BLANKETS
H&P reviewed. The patient was examined and there are no changes to the H&P.      Risks and benefits discussed with patient. Patient understands these and would like to proceed with procedure.     no

## 2023-09-01 ENCOUNTER — RX RENEWAL (OUTPATIENT)
Age: 78
End: 2023-09-01

## 2023-10-09 PROBLEM — J43.9 EMPHYSEMA, UNSPECIFIED: Status: ACTIVE | Noted: 2019-05-28

## 2023-10-09 PROBLEM — Z00.00 HEALTH CARE MAINTENANCE: Status: ACTIVE | Noted: 2019-10-08

## 2023-10-09 PROBLEM — E21.0 PRIMARY HYPERPARATHYROIDISM: Status: ACTIVE | Noted: 2017-03-22

## 2023-10-10 ENCOUNTER — NON-APPOINTMENT (OUTPATIENT)
Age: 78
End: 2023-10-10

## 2023-10-10 ENCOUNTER — APPOINTMENT (OUTPATIENT)
Age: 78
End: 2023-10-10
Payer: MEDICARE

## 2023-10-10 ENCOUNTER — OUTPATIENT (OUTPATIENT)
Dept: OUTPATIENT SERVICES | Facility: HOSPITAL | Age: 78
LOS: 1 days | End: 2023-10-10
Payer: MEDICARE

## 2023-10-10 VITALS
OXYGEN SATURATION: 96 % | SYSTOLIC BLOOD PRESSURE: 130 MMHG | DIASTOLIC BLOOD PRESSURE: 78 MMHG | BODY MASS INDEX: 29.71 KG/M2 | HEIGHT: 64 IN | WEIGHT: 174 LBS | HEART RATE: 89 BPM

## 2023-10-10 DIAGNOSIS — I10 ESSENTIAL (PRIMARY) HYPERTENSION: ICD-10-CM

## 2023-10-10 DIAGNOSIS — E21.0 PRIMARY HYPERPARATHYROIDISM: ICD-10-CM

## 2023-10-10 DIAGNOSIS — Z00.00 ENCOUNTER FOR GENERAL ADULT MEDICAL EXAMINATION W/OUT ABNORMAL FINDINGS: ICD-10-CM

## 2023-10-10 DIAGNOSIS — R73.9 HYPERGLYCEMIA, UNSPECIFIED: ICD-10-CM

## 2023-10-10 DIAGNOSIS — Z98.890 OTHER SPECIFIED POSTPROCEDURAL STATES: Chronic | ICD-10-CM

## 2023-10-10 DIAGNOSIS — K86.2 CYST OF PANCREAS: ICD-10-CM

## 2023-10-10 DIAGNOSIS — J43.9 EMPHYSEMA, UNSPECIFIED: ICD-10-CM

## 2023-10-10 DIAGNOSIS — Z23 ENCOUNTER FOR IMMUNIZATION: ICD-10-CM

## 2023-10-10 DIAGNOSIS — Z98.89 OTHER SPECIFIED POSTPROCEDURAL STATES: Chronic | ICD-10-CM

## 2023-10-10 DIAGNOSIS — E78.5 HYPERLIPIDEMIA, UNSPECIFIED: ICD-10-CM

## 2023-10-10 PROCEDURE — G0439: CPT | Mod: 25

## 2023-10-10 PROCEDURE — G0439: CPT

## 2023-10-10 PROCEDURE — G0444 DEPRESSION SCREEN ANNUAL: CPT

## 2023-10-10 PROCEDURE — G0442 ANNUAL ALCOHOL SCREEN 15 MIN: CPT

## 2023-10-10 PROCEDURE — G0008: CPT

## 2023-10-10 PROCEDURE — 93010 ELECTROCARDIOGRAM REPORT: CPT | Mod: 59

## 2023-10-10 PROCEDURE — 90662 IIV NO PRSV INCREASED AG IM: CPT

## 2023-10-15 LAB
ALBUMIN SERPL ELPH-MCNC: 4.1 G/DL
ALP BLD-CCNC: 107 U/L
ALT SERPL-CCNC: 18 U/L
ANION GAP SERPL CALC-SCNC: 11 MMOL/L
AST SERPL-CCNC: 24 U/L
BILIRUB SERPL-MCNC: 0.5 MG/DL
BUN SERPL-MCNC: 20 MG/DL
CALCIUM SERPL-MCNC: 9.5 MG/DL
CHLORIDE SERPL-SCNC: 104 MMOL/L
CHOLEST SERPL-MCNC: 208 MG/DL
CO2 SERPL-SCNC: 26 MMOL/L
CREAT SERPL-MCNC: 0.89 MG/DL
EGFR: 66 ML/MIN/1.73M2
ESTIMATED AVERAGE GLUCOSE: 111 MG/DL
GLUCOSE SERPL-MCNC: 116 MG/DL
HBA1C MFR BLD HPLC: 5.5 %
HCT VFR BLD CALC: 47 %
HDLC SERPL-MCNC: 83 MG/DL
HGB BLD-MCNC: 14.8 G/DL
LDLC SERPL CALC-MCNC: 106 MG/DL
MCHC RBC-ENTMCNC: 31.5 GM/DL
MCHC RBC-ENTMCNC: 33.1 PG
MCV RBC AUTO: 105.1 FL
NONHDLC SERPL-MCNC: 125 MG/DL
PLATELET # BLD AUTO: 239 K/UL
POTASSIUM SERPL-SCNC: 4.6 MMOL/L
PROT SERPL-MCNC: 6.6 G/DL
RBC # BLD: 4.47 M/UL
RBC # FLD: 12.5 %
SODIUM SERPL-SCNC: 142 MMOL/L
TRIGL SERPL-MCNC: 110 MG/DL
TSH SERPL-ACNC: 1.1 UIU/ML
WBC # FLD AUTO: 4.07 K/UL

## 2023-10-17 DIAGNOSIS — Z23 ENCOUNTER FOR IMMUNIZATION: ICD-10-CM

## 2023-10-17 DIAGNOSIS — E21.0 PRIMARY HYPERPARATHYROIDISM: ICD-10-CM

## 2023-10-17 DIAGNOSIS — E78.5 HYPERLIPIDEMIA, UNSPECIFIED: ICD-10-CM

## 2023-10-17 DIAGNOSIS — R73.9 HYPERGLYCEMIA, UNSPECIFIED: ICD-10-CM

## 2023-10-17 DIAGNOSIS — K86.2 CYST OF PANCREAS: ICD-10-CM

## 2023-10-17 DIAGNOSIS — Z00.00 ENCOUNTER FOR GENERAL ADULT MEDICAL EXAMINATION WITHOUT ABNORMAL FINDINGS: ICD-10-CM

## 2023-10-17 DIAGNOSIS — J43.9 EMPHYSEMA, UNSPECIFIED: ICD-10-CM

## 2023-11-01 ENCOUNTER — APPOINTMENT (OUTPATIENT)
Dept: DERMATOLOGY | Facility: CLINIC | Age: 78
End: 2023-11-01
Payer: MEDICARE

## 2023-11-01 DIAGNOSIS — B07.9 VIRAL WART, UNSPECIFIED: ICD-10-CM

## 2023-11-01 DIAGNOSIS — C44.91 BASAL CELL CARCINOMA OF SKIN, UNSPECIFIED: ICD-10-CM

## 2023-11-01 PROCEDURE — 99213 OFFICE O/P EST LOW 20 MIN: CPT

## 2023-11-24 ENCOUNTER — OUTPATIENT (OUTPATIENT)
Dept: OUTPATIENT SERVICES | Facility: HOSPITAL | Age: 78
LOS: 1 days | Discharge: HOME | End: 2023-11-24

## 2023-11-24 DIAGNOSIS — Z98.89 OTHER SPECIFIED POSTPROCEDURAL STATES: Chronic | ICD-10-CM

## 2023-11-24 DIAGNOSIS — N20.0 CALCULUS OF KIDNEY: Chronic | ICD-10-CM

## 2023-11-24 DIAGNOSIS — Z98.890 OTHER SPECIFIED POSTPROCEDURAL STATES: Chronic | ICD-10-CM

## 2023-11-24 DIAGNOSIS — D64.9 ANEMIA, UNSPECIFIED: ICD-10-CM

## 2023-11-29 NOTE — HISTORY OF PRESENT ILLNESS
[de-identified] : 74 y/o F with a few months of left sided ear fullness.  Feels hearing is good.  No pain or d/c, no tinnitus or vertigo.  Used Debrox, however not feeling like it helped. \par Also constant, mild, nasal congestion.  Feels it is mild and avoids taking anything for it.  Uses Zyrtec when she feels it is bad, however felt she did not need them this year.  Has no interest in using nasal sprays.  set-up required/verbal cues/nonverbal cues (demo/gestures)/1 person assist

## 2023-11-30 ENCOUNTER — RX RENEWAL (OUTPATIENT)
Age: 78
End: 2023-11-30

## 2023-12-06 ENCOUNTER — APPOINTMENT (OUTPATIENT)
Dept: GASTROENTEROLOGY | Facility: CLINIC | Age: 78
End: 2023-12-06
Payer: MEDICARE

## 2023-12-06 VITALS
BODY MASS INDEX: 29.02 KG/M2 | DIASTOLIC BLOOD PRESSURE: 89 MMHG | OXYGEN SATURATION: 96 % | SYSTOLIC BLOOD PRESSURE: 143 MMHG | HEIGHT: 64 IN | HEART RATE: 114 BPM | WEIGHT: 170 LBS

## 2023-12-06 PROCEDURE — 99204 OFFICE O/P NEW MOD 45 MIN: CPT

## 2023-12-11 ENCOUNTER — APPOINTMENT (OUTPATIENT)
Dept: HEMATOLOGY ONCOLOGY | Facility: CLINIC | Age: 78
End: 2023-12-11
Payer: MEDICARE

## 2023-12-11 VITALS
DIASTOLIC BLOOD PRESSURE: 80 MMHG | OXYGEN SATURATION: 95 % | WEIGHT: 173.5 LBS | BODY MASS INDEX: 29.78 KG/M2 | RESPIRATION RATE: 16 BRPM | TEMPERATURE: 97.3 F | HEART RATE: 94 BPM | SYSTOLIC BLOOD PRESSURE: 132 MMHG

## 2023-12-11 DIAGNOSIS — C50.919 MALIGNANT NEOPLASM OF UNSPECIFIED SITE OF UNSPECIFIED FEMALE BREAST: ICD-10-CM

## 2023-12-11 DIAGNOSIS — Z79.811 LONG TERM (CURRENT) USE OF AROMATASE INHIBITORS: ICD-10-CM

## 2023-12-11 PROCEDURE — 99214 OFFICE O/P EST MOD 30 MIN: CPT

## 2023-12-20 ENCOUNTER — OUTPATIENT (OUTPATIENT)
Dept: OUTPATIENT SERVICES | Facility: HOSPITAL | Age: 78
LOS: 1 days | End: 2023-12-20
Payer: MEDICARE

## 2023-12-20 ENCOUNTER — NON-APPOINTMENT (OUTPATIENT)
Age: 78
End: 2023-12-20

## 2023-12-20 ENCOUNTER — APPOINTMENT (OUTPATIENT)
Dept: INTERNAL MEDICINE | Facility: CLINIC | Age: 78
End: 2023-12-20
Payer: MEDICARE

## 2023-12-20 DIAGNOSIS — N20.0 CALCULUS OF KIDNEY: Chronic | ICD-10-CM

## 2023-12-20 DIAGNOSIS — I10 ESSENTIAL (PRIMARY) HYPERTENSION: ICD-10-CM

## 2023-12-20 DIAGNOSIS — N20.0 CALCULUS OF KIDNEY: ICD-10-CM

## 2023-12-20 DIAGNOSIS — Z98.89 OTHER SPECIFIED POSTPROCEDURAL STATES: Chronic | ICD-10-CM

## 2023-12-20 DIAGNOSIS — Z01.818 ENCOUNTER FOR OTHER PREPROCEDURAL EXAMINATION: ICD-10-CM

## 2023-12-20 PROCEDURE — 93010 ELECTROCARDIOGRAM REPORT: CPT

## 2023-12-20 PROCEDURE — G0463: CPT

## 2023-12-20 PROCEDURE — 99214 OFFICE O/P EST MOD 30 MIN: CPT | Mod: 25

## 2023-12-20 NOTE — PHYSICAL EXAM
[No Acute Distress] : no acute distress [Well-Appearing] : well-appearing [Normal Sclera/Conjunctiva] : normal sclera/conjunctiva [EOMI] : extraocular movements intact [No Respiratory Distress] : no respiratory distress  [Clear to Auscultation] : lungs were clear to auscultation bilaterally [Normal Rate] : normal rate  [Regular Rhythm] : with a regular rhythm [Normal S1, S2] : normal S1 and S2 [No Edema] : there was no peripheral edema [Soft] : abdomen soft [Non Tender] : non-tender [Normal Bowel Sounds] : normal bowel sounds [No CVA Tenderness] : no CVA  tenderness [No Spinal Tenderness] : no spinal tenderness [Grossly Normal Strength/Tone] : grossly normal strength/tone [No Rash] : no rash [Coordination Grossly Intact] : coordination grossly intact [Normal Affect] : the affect was normal

## 2023-12-20 NOTE — ASSESSMENT
[High Risk Surgery - Intraperitoneal, Intrathoracic or Supringuinal Vascular Procedures] : High Risk Surgery - Intraperitoneal, Intrathoracic or Supringuinal Vascular Procedures - No (0) [Ischemic Heart Disease] : Ischemic Heart Disease - No (0) [Congestive Heart Failure] : Congestive Heart Failure - No (0) [Prior Cerebrovascular Accident or TIA] : Prior Cerebrovascular Accident or TIA - No (0) [Creatinine >= 2mg/dL (1 Point)] : Creatinine >= 2mg/dL - No (0) [Insulin-dependent Diabetic (1 Point)] : Insulin-dependent Diabetic - No (0) [0] : 0 , RCRI Class: I, Risk of Post-Op Cardiac Complications: 3.9%, 95% CI for Risk Estimate: 2.8% - 5.4% [Patient Optimized for Surgery] : Patient optimized for surgery [No Further Testing Recommended] : no further testing recommended [As per surgery] : as per surgery [FreeTextEntry7] : No ASA or NSAIDS for 5 days prior to procedure. Take BP meds in am before procedure - rest can be taken after when eating and drinking normally

## 2023-12-20 NOTE — HISTORY OF PRESENT ILLNESS
[No Pertinent Cardiac History] : no history of aortic stenosis, atrial fibrillation, coronary artery disease, recent myocardial infarction, or implantable device/pacemaker [(Patient denies any chest pain, claudication, dyspnea on exertion, orthopnea, palpitations or syncope)] : Patient denies any chest pain, claudication, dyspnea on exertion, orthopnea, palpitations or syncope [Moderate (4-6 METs)] : Moderate (4-6 METs) [Asthma] : no asthma [COPD] : no COPD [Sleep Apnea] : no sleep apnea [Smoker] : not a smoker [Chronic Anticoagulation] : no chronic anticoagulation [Chronic Kidney Disease] : no chronic kidney disease [Diabetes] : no diabetes [FreeTextEntry1] : lithotripsy  [FreeTextEntry2] : 1/17/23 [FreeTextEntry3] : Dr Meade Meyers [FreeTextEntry4] : 79 yo F with h/o HTN, HLD, breast CA s/p lumpectomy/XRT, primary hyperparathyroidism s/p partial parathyroid resection, osteoporosis, nephrolithiasis, pancreatic cyst  no sx of infection no abnormal bleeding or bruising [FreeTextEntry7] : ECHO 2020

## 2024-01-02 DIAGNOSIS — Z01.818 ENCOUNTER FOR OTHER PREPROCEDURAL EXAMINATION: ICD-10-CM

## 2024-01-02 DIAGNOSIS — N20.0 CALCULUS OF KIDNEY: ICD-10-CM

## 2024-02-01 ENCOUNTER — APPOINTMENT (OUTPATIENT)
Dept: RADIOLOGY | Facility: CLINIC | Age: 79
End: 2024-02-01
Payer: MEDICARE

## 2024-02-01 PROCEDURE — 77080 DXA BONE DENSITY AXIAL: CPT

## 2024-02-07 ENCOUNTER — NON-APPOINTMENT (OUTPATIENT)
Age: 79
End: 2024-02-07

## 2024-02-15 ENCOUNTER — APPOINTMENT (OUTPATIENT)
Dept: MRI IMAGING | Facility: CLINIC | Age: 79
End: 2024-02-15
Payer: MEDICARE

## 2024-02-15 PROCEDURE — 74183 MRI ABD W/O CNTR FLWD CNTR: CPT

## 2024-02-15 PROCEDURE — A9585: CPT

## 2024-03-07 ENCOUNTER — TRANSCRIPTION ENCOUNTER (OUTPATIENT)
Age: 79
End: 2024-03-07

## 2024-05-06 ENCOUNTER — APPOINTMENT (OUTPATIENT)
Dept: DERMATOLOGY | Facility: CLINIC | Age: 79
End: 2024-05-06
Payer: MEDICARE

## 2024-05-06 DIAGNOSIS — L57.0 ACTINIC KERATOSIS: ICD-10-CM

## 2024-05-06 DIAGNOSIS — L85.3 XEROSIS CUTIS: ICD-10-CM

## 2024-05-06 DIAGNOSIS — L82.1 OTHER SEBORRHEIC KERATOSIS: ICD-10-CM

## 2024-05-06 DIAGNOSIS — Z85.828 PERSONAL HISTORY OF OTHER MALIGNANT NEOPLASM OF SKIN: ICD-10-CM

## 2024-05-06 DIAGNOSIS — Z12.83 ENCOUNTER FOR SCREENING FOR MALIGNANT NEOPLASM OF SKIN: ICD-10-CM

## 2024-05-06 DIAGNOSIS — D22.9 MELANOCYTIC NEVI, UNSPECIFIED: ICD-10-CM

## 2024-05-06 PROCEDURE — 99213 OFFICE O/P EST LOW 20 MIN: CPT

## 2024-05-15 ENCOUNTER — RX RENEWAL (OUTPATIENT)
Age: 79
End: 2024-05-15

## 2024-06-11 NOTE — ASU PATIENT PROFILE, ADULT - DOES PATIENT HAVE ADVANCE DIRECTIVE
Number Of Freeze-Thaw Cycles: 1 freeze-thaw cycle Render Post-Care Instructions In Note?: no Show Aperture Variable?: Yes Detail Level: Detailed Consent: The patient's consent was obtained including but not limited to risks of crusting, scabbing, blistering, scarring, darker or lighter pigmentary change, recurrence, incomplete removal and infection. Duration Of Freeze Thaw-Cycle (Seconds): 3 Post-Care Instructions: I reviewed with the patient in detail post-care instructions. Patient is to wear sunprotection, and avoid picking at any of the treated lesions. Pt may apply Vaseline to crusted or scabbing areas. No

## 2024-07-25 ENCOUNTER — OUTPATIENT (OUTPATIENT)
Dept: OUTPATIENT SERVICES | Facility: HOSPITAL | Age: 79
LOS: 1 days | Discharge: HOME | End: 2024-07-25

## 2024-07-25 DIAGNOSIS — Z98.89 OTHER SPECIFIED POSTPROCEDURAL STATES: Chronic | ICD-10-CM

## 2024-07-25 DIAGNOSIS — D64.9 ANEMIA, UNSPECIFIED: ICD-10-CM

## 2024-07-25 DIAGNOSIS — N20.0 CALCULUS OF KIDNEY: Chronic | ICD-10-CM

## 2024-07-25 DIAGNOSIS — Z98.890 OTHER SPECIFIED POSTPROCEDURAL STATES: Chronic | ICD-10-CM

## 2024-08-01 ENCOUNTER — APPOINTMENT (OUTPATIENT)
Dept: HEMATOLOGY ONCOLOGY | Facility: CLINIC | Age: 79
End: 2024-08-01
Payer: MEDICARE

## 2024-08-01 VITALS
WEIGHT: 175.25 LBS | TEMPERATURE: 97.1 F | HEART RATE: 85 BPM | DIASTOLIC BLOOD PRESSURE: 77 MMHG | SYSTOLIC BLOOD PRESSURE: 134 MMHG | RESPIRATION RATE: 16 BRPM | BODY MASS INDEX: 30.08 KG/M2 | OXYGEN SATURATION: 96 %

## 2024-08-01 DIAGNOSIS — C50.919 MALIGNANT NEOPLASM OF UNSPECIFIED SITE OF UNSPECIFIED FEMALE BREAST: ICD-10-CM

## 2024-08-01 DIAGNOSIS — M81.0 AGE-RELATED OSTEOPOROSIS W/OUT CURRENT PATHOLOGICAL FRACTURE: ICD-10-CM

## 2024-08-01 PROCEDURE — 99214 OFFICE O/P EST MOD 30 MIN: CPT

## 2024-08-01 PROCEDURE — G2211 COMPLEX E/M VISIT ADD ON: CPT

## 2024-08-01 NOTE — ASSESSMENT
[FreeTextEntry1] : 80 yo woman with history of Stage 1A right breast cancer ER+IA+, her2 negative; Oncotype low risk; s/p lumpectomy in late 2012 with Dr. Marianne Rojas at [facility], completed RT and adjuvant endocrine therapy 3/2013 to 3/2023 and tolerated well.  - now off anastrozole and advised patient that is no role for extension past 10 years of endocrine therapy - bone density in 2019 with endocrine (Dr. Acosta) showed osteoporosis; has not had any therapy; repeat bone density in 1/2020 and 1/2022 with osteopenia; Most recent 2/2024 with osteopenia and osteoporosis. Will refer to endocrinology (Dr. Lemon's office) - due for mammo/sono, She will schedule at [facility] in [place] - encouraged to schedule follow-up with Dr. Rojas (breast surgeon) who is now at [facility] in [facility] MAINTENANCE SCREENING RECOMMENDATIONS PMD at least annually with lipid checks/bloodwork, GYN at least annually and PAP test screening per their recommendations, GI for colon cancer screening/colonoscopy at least every 10 years; being followed by pancreatic cyst clinic dermatology for annual skin checks  Patient had the opportunity to have all their questions answered to their satisfaction to follow up in 1 year

## 2024-08-01 NOTE — PHYSICAL EXAM
[Fully active, able to carry on all pre-disease performance without restriction] : Status 0 - Fully active, able to carry on all pre-disease performance without restriction [Normal] : affect appropriate [de-identified] : RIght breast well healed incision and right axillary LN incision site; no palpable lesions, no nipple discharge or skin dimpling in either breast.

## 2024-08-01 NOTE — ASSESSMENT
[FreeTextEntry1] : 78 yo woman with history of Stage 1A right breast cancer ER+MA+, her2 negative; Oncotype low risk; s/p lumpectomy in late 2012 with Dr. Marianne Rojas at [facility], completed RT and adjuvant endocrine therapy 3/2013 to 3/2023 and tolerated well.  - now off anastrozole and advised patient that is no role for extension past 10 years of endocrine therapy - bone density in 2019 with endocrine (Dr. Acosta) showed osteoporosis; has not had any therapy; repeat bone density in 1/2020 and 1/2022 with osteopenia; Most recent 2/2024 with osteopenia and osteoporosis. Will refer to endocrinology (Dr. Lemon's office) - due for mammo/sono, She will schedule at [facility] in [place] - encouraged to schedule follow-up with Dr. Rojas (breast surgeon) who is now at [facility] in [facility] MAINTENANCE SCREENING RECOMMENDATIONS PMD at least annually with lipid checks/bloodwork, GYN at least annually and PAP test screening per their recommendations, GI for colon cancer screening/colonoscopy at least every 10 years; being followed by pancreatic cyst clinic dermatology for annual skin checks  Patient had the opportunity to have all their questions answered to their satisfaction to follow up in 1 year

## 2024-08-01 NOTE — END OF VISIT
[] : Fellow [FreeTextEntry3] : Patient seen and examined with heme/onc fellow (Dr. Prashanth Easley PGY- 6)

## 2024-08-01 NOTE — HISTORY OF PRESENT ILLNESS
[100: Normal, no complaints, no evidence of disease.] : 100: Normal, no complaints, no evidence of disease. [de-identified] : Patient initially evaluated at [facility] in 2012 after she underwent lumpectomy with Dr. Marianne Rojas at [facility] for right breast invasive carcinoma. She had ER+MA+ her2 kirk negative disease but specimen was too small to have Oncotype testing done. After completion of RT to the right breast she was started on endocrine therapy with anastrozole in 2/2013 without incident. During imaging she was noted to have liver lesion which ultimately was determined to be stable cyst.  Has been monitored radiographically and clinically Transferred care from [facility] to [facility] in 2/2023.   Disease: Right breast cancer   TNM stage: T1, N0, M0 AJCC Stage: 1A   Tumor Markers: ER+MA+ Her2 -; Oncotype - unable to obtain due to small specimen size [de-identified] : 8/1/24 Patient returns today to rule out progression or recurrence of breast cancer and to assess treatment toxicity. Completed adjuvant endocrine therapy; remains on surveillance.  DEXA from 2/1/24 reviewed; she  did not follow up with endocrine as requested; does not wish to get injections or infusions  Denies interval hospitalizations or urgent care visits.

## 2024-08-01 NOTE — PHYSICAL EXAM
[Fully active, able to carry on all pre-disease performance without restriction] : Status 0 - Fully active, able to carry on all pre-disease performance without restriction [Normal] : affect appropriate [de-identified] : RIght breast well healed incision and right axillary LN incision site; no palpable lesions, no nipple discharge or skin dimpling in either breast.

## 2024-08-01 NOTE — HISTORY OF PRESENT ILLNESS
[100: Normal, no complaints, no evidence of disease.] : 100: Normal, no complaints, no evidence of disease. [de-identified] : Patient initially evaluated at [facility] in 2012 after she underwent lumpectomy with Dr. Marianne Rojas at [facility] for right breast invasive carcinoma. She had ER+CT+ her2 kirk negative disease but specimen was too small to have Oncotype testing done. After completion of RT to the right breast she was started on endocrine therapy with anastrozole in 2/2013 without incident. During imaging she was noted to have liver lesion which ultimately was determined to be stable cyst.  Has been monitored radiographically and clinically Transferred care from [facility] to [facility] in 2/2023.   Disease: Right breast cancer   TNM stage: T1, N0, M0 AJCC Stage: 1A   Tumor Markers: ER+CT+ Her2 -; Oncotype - unable to obtain due to small specimen size [de-identified] : 8/1/24 Patient returns today to rule out progression or recurrence of breast cancer and to assess treatment toxicity. Completed adjuvant endocrine therapy; remains on surveillance.  DEXA from 2/1/24 reviewed; she  did not follow up with endocrine as requested; does not wish to get injections or infusions  Denies interval hospitalizations or urgent care visits.

## 2024-08-02 ENCOUNTER — APPOINTMENT (OUTPATIENT)
Dept: MRI IMAGING | Facility: CLINIC | Age: 79
End: 2024-08-02
Payer: MEDICARE

## 2024-08-02 PROCEDURE — 74183 MRI ABD W/O CNTR FLWD CNTR: CPT

## 2024-08-02 PROCEDURE — A9585: CPT

## 2024-08-08 ENCOUNTER — NON-APPOINTMENT (OUTPATIENT)
Age: 79
End: 2024-08-08

## 2024-08-09 ENCOUNTER — APPOINTMENT (OUTPATIENT)
Dept: SURGICAL ONCOLOGY | Facility: CLINIC | Age: 79
End: 2024-08-09

## 2024-08-09 PROCEDURE — 99213 OFFICE O/P EST LOW 20 MIN: CPT

## 2024-08-09 NOTE — ASSESSMENT
[FreeTextEntry1] : 79 year old female who presents for a follow-up visit in our pancreas cyst clinic. Pancreas cyst since at least 2018 and EUS by Dr Car 2021 and 8/30/22 (report scanned in), no specimens taken but noted stable findings from prior.  She presents after recent surveillance MRI performed MRI 8/2/24 showing stable pancreatic cystic lesions, largest 2.2 cm.   Briefly reviewed cyst pathophysiology with the patient.  Also reviewed [facility] Pancreas Cyst Program with the patient.  We will continue to follow her in our program for scheduled ongoing surveillance.   Reviewed patient's imaging at our pancreas benign conference on 8/7/24 with radiologist, advanced GI and surgical oncology disciplines present, and confirmed stable findings without worrisome features. The consensus at benign conference is for repeat MRI in 6-12 months to assess stability and follow-up in cyst clinic after.   Pt had to take anxiolytic prior to MRI. She might be moving out of state in the next year.  We decided upon MRI 5/2025 (9M interval).      I discussed symptoms that would raise my concern for malignant transformation including unintended weight loss, jaundice, abdominal/ back pain, and new onset or worsening diabetes. Should these develop, She should call immediately.   Patient verbalized understanding and agrees to plan.   Today, I personally spent 25 minutes in total time including reviewing imaging and studies, discussing treatment regimens, direct face to face time with the patient, patient education and counseling.

## 2024-08-09 NOTE — HISTORY OF PRESENT ILLNESS
[de-identified] : This visit was provided via telehealth using real-time 2-way audio visual technology. The patient, SONYA MENDEZ, was located at home [address] [place] [address] at the time of the visit. This provider was located at the clinic in [place], [place] at the time of the visit. The provider, patient participated in the telehealth encounter.  Verbal consent was given Aug  9 2024 10:00AM by the patient.  Ms. SONYA MENDEZ is a 79 year old female who presents for a follow-up visit in our pancreas cyst clinic.  PMH/PSH of HTN, HLD, breast ca s/p lumpectomy/XRT, primary hyperparathyroid s/p partial parathyroid resection, osteoporosis, nephrolithiasis.  She has a known cyst previously followed by Dr. Car but he does not take her insurance anymore.  EUS by Dr Car 2021 and 8/30/22 (report scanned in), no specimens taken but noted stable findings from prior EUS.  PD 3.7 mm.   CT A/P 11/16/23 incidentally showing 1.8cm posterior BOP cyst as well as small parenchymal calcifications in the body/tail, all unchanged.   MRI 2/15/24: Scattered pancreatic cystic lesions. The largest cystic lesion in the proximal pancreatic body measures 2.4 cm. No main pancreatic ductal dilatation or nodular enhancement.   She presents after recent surveillance MRI performed MRI 8/2/24 showing stable pancreatic cystic lesions, largest 2.2 cm.   Family ca hx: brother w/ gastric ca (age 50, possibly due to exposure to agent orange).  No PDAC Pt without history of genetic testing.  She is asymptomatic in reference to pancreas. No abdominal or back pain. Normal stools, normal appetite, stable weight.  No history of DM or pancreatitis. A1c 5.5% (10/2023).

## 2024-08-09 NOTE — ASSESSMENT
[FreeTextEntry1] : 79 year old female who presents for a follow-up visit in our pancreas cyst clinic. Pancreas cyst since at least 2018 and EUS by Dr Car 2021 and 8/30/22 (report scanned in), no specimens taken but noted stable findings from prior.  She presents after recent surveillance MRI performed MRI 8/2/24 showing stable pancreatic cystic lesions, largest 2.2 cm.   Briefly reviewed cyst pathophysiology with the patient.  Also reviewed [facility] Pancreas Cyst Program with the patient.  We will continue to follow her in our program for scheduled ongoing surveillance.   Reviewed patient's imaging at our pancreas benign conference on 8/7/24 with radiologist, advanced GI and surgical oncology disciplines present, and confirmed stable findings without worrisome features. The consensus at benign conference is for repeat MRI in 6-12 months to assess stability and follow-up in cyst clinic after.   Pt had to take anxiolytic prior to MRI. She might be moving out of state in the next year.  We decided upon MRI 5/2025 (9M interval).      I discussed symptoms that would raise my concern for malignant transformation including unintended weight loss, jaundice, abdominal/ back pain, and new onset or worsening diabetes. Should these develop, She should call immediately.   Patient verbalized understanding and agrees to plan.   Today, I personally spent 25 minutes in total time including reviewing imaging and studies, discussing treatment regimens, direct face to face time with the patient, patient education and counseling.

## 2024-08-09 NOTE — REASON FOR VISIT
[Follow-Up Visit] : a follow-up visit for [Referred By: ___] : Referred By: [unfilled] [FreeTextEntry2] : pancreas cysts

## 2024-08-09 NOTE — HISTORY OF PRESENT ILLNESS
[de-identified] : This visit was provided via telehealth using real-time 2-way audio visual technology. The patient, SONYA MENDEZ, was located at home [address] [place] [address] at the time of the visit. This provider was located at the clinic in [place], [place] at the time of the visit. The provider, patient participated in the telehealth encounter.  Verbal consent was given Aug  9 2024 10:00AM by the patient.  Ms. SONYA MENDEZ is a 79 year old female who presents for a follow-up visit in our pancreas cyst clinic.  PMH/PSH of HTN, HLD, breast ca s/p lumpectomy/XRT, primary hyperparathyroid s/p partial parathyroid resection, osteoporosis, nephrolithiasis.  She has a known cyst previously followed by Dr. Car but he does not take her insurance anymore.  EUS by Dr Car 2021 and 8/30/22 (report scanned in), no specimens taken but noted stable findings from prior EUS.  PD 3.7 mm.   CT A/P 11/16/23 incidentally showing 1.8cm posterior BOP cyst as well as small parenchymal calcifications in the body/tail, all unchanged.   MRI 2/15/24: Scattered pancreatic cystic lesions. The largest cystic lesion in the proximal pancreatic body measures 2.4 cm. No main pancreatic ductal dilatation or nodular enhancement.   She presents after recent surveillance MRI performed MRI 8/2/24 showing stable pancreatic cystic lesions, largest 2.2 cm.   Family ca hx: brother w/ gastric ca (age 50, possibly due to exposure to agent orange).  No PDAC Pt without history of genetic testing.  She is asymptomatic in reference to pancreas. No abdominal or back pain. Normal stools, normal appetite, stable weight.  No history of DM or pancreatitis. A1c 5.5% (10/2023).

## 2024-08-21 ENCOUNTER — APPOINTMENT (OUTPATIENT)
Dept: ULTRASOUND IMAGING | Facility: CLINIC | Age: 79
End: 2024-08-21
Payer: MEDICARE

## 2024-08-21 ENCOUNTER — RESULT REVIEW (OUTPATIENT)
Age: 79
End: 2024-08-21

## 2024-08-21 ENCOUNTER — APPOINTMENT (OUTPATIENT)
Dept: MAMMOGRAPHY | Facility: CLINIC | Age: 79
End: 2024-08-21
Payer: MEDICARE

## 2024-08-21 PROCEDURE — 76641 ULTRASOUND BREAST COMPLETE: CPT | Mod: 50

## 2024-08-21 PROCEDURE — 77063 BREAST TOMOSYNTHESIS BI: CPT

## 2024-08-21 PROCEDURE — 77067 SCR MAMMO BI INCL CAD: CPT

## 2024-09-17 ENCOUNTER — APPOINTMENT (OUTPATIENT)
Dept: ENDOCRINOLOGY | Facility: CLINIC | Age: 79
End: 2024-09-17
Payer: MEDICARE

## 2024-09-17 VITALS
BODY MASS INDEX: 29.53 KG/M2 | WEIGHT: 173 LBS | HEART RATE: 99 BPM | SYSTOLIC BLOOD PRESSURE: 144 MMHG | HEIGHT: 64 IN | DIASTOLIC BLOOD PRESSURE: 82 MMHG | OXYGEN SATURATION: 97 %

## 2024-09-17 DIAGNOSIS — E21.0 PRIMARY HYPERPARATHYROIDISM: ICD-10-CM

## 2024-09-17 DIAGNOSIS — M81.0 AGE-RELATED OSTEOPOROSIS W/OUT CURRENT PATHOLOGICAL FRACTURE: ICD-10-CM

## 2024-09-17 PROCEDURE — G2211 COMPLEX E/M VISIT ADD ON: CPT

## 2024-09-17 PROCEDURE — 99205 OFFICE O/P NEW HI 60 MIN: CPT

## 2024-09-17 RX ORDER — RISEDRONATE SODIUM 150 MG/1
150 TABLET, FILM COATED ORAL
Qty: 3 | Refills: 3 | Status: ACTIVE | COMMUNITY
Start: 2024-09-17 | End: 1900-01-01

## 2024-09-17 NOTE — ASSESSMENT
[FreeTextEntry1] : The patient is referred for initial consultation for osteoporosis, 09/17/2024.   H/o premature natural menopause at age 39. +HRT. She was told osteopenia to the spine and hip as early as 2003.  H/o Breast CA 2012 s/p lumpectomy and RT. Anastrozole 2012 - 2022. UTD mammogram.  Seen by Dr. Acosta for PHPT 6/2019. H/o hypercalcemia since 10/2016 with elevated PTH. Previously on HCTZ which was discontinued. Pt had kidney stone removal in 2018 and remains on potassium citrate. BMD 1/2019 spine: -1.7 tot hip: -1.3 fem neck: -1.7. BMD 2019 proximal radius: -2.9, osteoporosis.  She underwent R parathyroidectomy with resection left lower thyroid nodule 7/2019. Supplements with Ca and Vit D3, unsure of dose, not consistent.   Pt had reoccurrence of kidney stones s/p lithotripsy 1/2023 with Dr. Deshaun Meyers.  H/o clavicle fracture as a child. No significant fhx.   BMD 2/2024 shows spine -1.8, TH -0.7, FN -2.2, WA -3.1. BMD results were discussed with the patient.  Given the patient's history and BMD, the patient is at an increased risk of future fracture. Options of medical therapy were discussed in detail including risks and benefits. Major decision is Prolia which is best at all three sites vs Bisphosphonates which does not typically improve forearm BMD but can protect against spine and hip fx. I do not recommend anabolic therapy due to low forearm, h/o RT, and with h/o breast cancer which can have reoccurrence and metastasize to the bone.   I discussed Rx with bisphosphonate therapy, including IV Reclast. Risks and benefits of bisphosphonate therapy were discussed including minor aches and pains, heartburn, gastroesophageal irritation (excluding IV Reclast), osteonecrosis of the jaw (ONJ), atypical femur fractures, and acute phase reaction (w/ IV Reclast only). The patient would benefit from bisphosphonate therapy with the expectation of a drug holiday within 3-5 years.  I discussed Rx with twice a year Prolia injection. Risks and benefits of Prolia discussed including ONJ and atypical femur fracture. I discussed that the patient cannot stop Prolia without expecting rapid bone loss and an increase in risk for future fracture unless they transition to another Rx. Furthermore, there is 10 year safety data for Prolia.   All questions were answered. Rx information handout provided. The patient understands and elects to start Risedronate  I discussed that weight bearing exercises, cardiovascular activities, and diet are beneficial to overall health, but are not adequate for bone density increase or alternative to osteoporosis medication.  I recommend supplementing with no more than 500 mg of Ca and 1000 IU of Vit D3 QD pending labs.  Draw labs today including CMP, PTH, Mag, Phos.  I will monitor response to therapy with a baseline biochemical marker of bone turnover, CTx. The patient is aware this may not be covered by insurance.   Follow up in 4 months. Virtual OK. Repeat BMD in 1 year after starting therapy.  Rebecca MS, Venkatesh SL, Latasha KL, Jackelyn EM, Lamin KG,  AJ, Willard ES. The clinician's guide to prevention and treatment of osteoporosis. Osteoporosis Int. 2022 Oct;33(10):7491-0964.

## 2024-09-17 NOTE — ASSESSMENT
[FreeTextEntry1] : The patient is referred for initial consultation for osteoporosis, 09/17/2024.   H/o premature natural menopause at age 39. +HRT. She was told osteopenia to the spine and hip as early as 2003.  H/o Breast CA 2012 s/p lumpectomy and RT. Anastrozole 2012 - 2022. UTD mammogram.  Seen by Dr. Acosta for PHPT 6/2019. H/o hypercalcemia since 10/2016 with elevated PTH. Previously on HCTZ which was discontinued. Pt had kidney stone removal in 2018 and remains on potassium citrate. BMD 1/2019 spine: -1.7 tot hip: -1.3 fem neck: -1.7. BMD 2019 proximal radius: -2.9, osteoporosis.  She underwent R parathyroidectomy with resection left lower thyroid nodule 7/2019. Supplements with Ca and Vit D3, unsure of dose, not consistent.   Pt had reoccurrence of kidney stones s/p lithotripsy 1/2023 with Dr. Deshaun Meyers.  H/o clavicle fracture as a child. No significant fhx.   BMD 2/2024 shows spine -1.8, TH -0.7, FN -2.2, ME -3.1. BMD results were discussed with the patient.  Given the patient's history and BMD, the patient is at an increased risk of future fracture. Options of medical therapy were discussed in detail including risks and benefits. Major decision is Prolia which is best at all three sites vs Bisphosphonates which does not typically improve forearm BMD but can protect against spine and hip fx. I do not recommend anabolic therapy due to low forearm, h/o RT, and with h/o breast cancer which can have reoccurrence and metastasize to the bone.   I discussed Rx with bisphosphonate therapy, including IV Reclast. Risks and benefits of bisphosphonate therapy were discussed including minor aches and pains, heartburn, gastroesophageal irritation (excluding IV Reclast), osteonecrosis of the jaw (ONJ), atypical femur fractures, and acute phase reaction (w/ IV Reclast only). The patient would benefit from bisphosphonate therapy with the expectation of a drug holiday within 3-5 years.  I discussed Rx with twice a year Prolia injection. Risks and benefits of Prolia discussed including ONJ and atypical femur fracture. I discussed that the patient cannot stop Prolia without expecting rapid bone loss and an increase in risk for future fracture unless they transition to another Rx. Furthermore, there is 10 year safety data for Prolia.   All questions were answered. Rx information handout provided. The patient understands and elects to start Risedronate  I discussed that weight bearing exercises, cardiovascular activities, and diet are beneficial to overall health, but are not adequate for bone density increase or alternative to osteoporosis medication.  I recommend supplementing with no more than 500 mg of Ca and 1000 IU of Vit D3 QD pending labs.  Draw labs today including CMP, PTH, Mag, Phos.  I will monitor response to therapy with a baseline biochemical marker of bone turnover, CTx. The patient is aware this may not be covered by insurance.   Follow up in 4 months. Virtual OK. Repeat BMD in 1 year after starting therapy.  Rebecca MS, Venkatesh SL, Latasha KL, Jackelyn EM, Lamin KG,  AJ, Willard ES. The clinician's guide to prevention and treatment of osteoporosis. Osteoporosis Int. 2022 Oct;33(10):1935-3667.

## 2024-09-17 NOTE — PROCEDURE
[FreeTextEntry1] : Bone Mineral Density: 02/01/2024 Indication: vs 2022 (NRAD) Spine: -1.8, osteopenia, prior -1.4 Femoral neck: -2.2 left, osteopenia, prior -1.4 Total hip: -0.7 left, normal, prior -1.1 Forearm: -3.1 left, osteoporosis, prior not measured

## 2024-09-17 NOTE — PHYSICAL EXAM
[Alert] : alert [No Acute Distress] : no acute distress [Normal Sclera/Conjunctiva] : normal sclera/conjunctiva [EOMI] : extra ocular movement intact [No Proptosis] : no proptosis [Normal Lips/Gums] : the lips and gums were normal [Normal Oropharynx] : the oropharynx was normal [Thyroid Not Enlarged] : the thyroid was not enlarged [No Respiratory Distress] : no respiratory distress [No Accessory Muscle Use] : no accessory muscle use [Normal Rate] : heart rate was normal [Regular Rhythm] : with a regular rhythm [No Edema] : no peripheral edema [Not Tender] : non-tender [Not Distended] : not distended [Soft] : abdomen soft [No Spinal Tenderness] : no spinal tenderness [Spine Straight] : spine straight [No Stigmata of Cushings Syndrome] : no stigmata of Cushings Syndrome [Normal Gait] : normal gait [Normal Strength/Tone] : muscle strength and tone were normal [Acanthosis Nigricans] : no acanthosis nigricans [No Tremors] : no tremors [Oriented x3] : oriented to person, place, and time

## 2024-09-17 NOTE — HISTORY OF PRESENT ILLNESS
[FreeTextEntry1] : H/o premature natural menopause at age 39. +HRT. She was told osteopenia to the spine and hip on screening DEXA as early as 2003. I do not have all the bone density data / images available from that time.   She was diagnosed with Breast CA 2012 s/p lumpectomy and RT. She was on Anastrozole 2012 - 2022. Still follows with Dr. Bertrand. UTD mammogram.  Patient was seen by Dr. Acosta for PHPT 6/2019. According to his note, pt has a h/o hypercalcemia since 10/2016 with elevated PTH. Previously on HCTZ which was discontinued. No h/o lithium use or fh/o Ca d/o or MENS. Pt had kidney stone removal in 2018 and was put on potassium citrate. BMD 1/2019 spine: -1.7 tot hip: -1.3 fem neck: -1.7. BMD of the proximal radius at consultation was -2.9, osteoporosis.  She underwent R parathyroidectomy with resection left lower thyroid nodule 7/2019. Pathology benign. Supplements with Ca and Vit D3, unsure of dose, not consistent.   Pt had reoccurrence of kidney stones s/p lithotripsy 1/2023 with Dr. Deshaun Meyers. Dr. Meyers reportedly aware of supplements.   Last DEXA 2/2024 shows spine -1.8, TH -0.7, FN -2.2, AK -3.1.  Active lifestyle. Exercise: denies Fractures: clavicle fracture as a child Frequent falls: denies Assistive device: denies Fhx: denies osteoporosis or parental hip fracture.   Diet: cheese, sometimes milk and yogurt. Some green, leafy vegetables in salads. Denies celiac disease, malabsorption, nutritional deficiencies, GIB, stomach ulcers.   Denies active smoking or excessive alcohol intake.   PMHx/PSHx:  HTN HLD Pancreatic cyst Denies Paget's Dz, diabetes, CVD, blood clots, and chronic steroid use.   Sees DDS every 6 months. No major dental work planned.

## 2024-09-17 NOTE — HISTORY OF PRESENT ILLNESS
[FreeTextEntry1] : H/o premature natural menopause at age 39. +HRT. She was told osteopenia to the spine and hip on screening DEXA as early as 2003. I do not have all the bone density data / images available from that time.   She was diagnosed with Breast CA 2012 s/p lumpectomy and RT. She was on Anastrozole 2012 - 2022. Still follows with Dr. Bertrand. UTD mammogram.  Patient was seen by Dr. Acosta for PHPT 6/2019. According to his note, pt has a h/o hypercalcemia since 10/2016 with elevated PTH. Previously on HCTZ which was discontinued. No h/o lithium use or fh/o Ca d/o or MENS. Pt had kidney stone removal in 2018 and was put on potassium citrate. BMD 1/2019 spine: -1.7 tot hip: -1.3 fem neck: -1.7. BMD of the proximal radius at consultation was -2.9, osteoporosis.  She underwent R parathyroidectomy with resection left lower thyroid nodule 7/2019. Pathology benign. Supplements with Ca and Vit D3, unsure of dose, not consistent.   Pt had reoccurrence of kidney stones s/p lithotripsy 1/2023 with Dr. Deshaun Meyers. Dr. Meyers reportedly aware of supplements.   Last DEXA 2/2024 shows spine -1.8, TH -0.7, FN -2.2, [place] -3.1.  Active lifestyle. Exercise: denies Fractures: clavicle fracture as a child Frequent falls: denies Assistive device: denies Fhx: denies osteoporosis or parental hip fracture.   Diet: cheese, sometimes milk and yogurt. Some green, leafy vegetables in salads. Denies celiac disease, malabsorption, nutritional deficiencies, GIB, stomach ulcers.   Denies active smoking or excessive alcohol intake.   PMHx/PSHx:  HTN HLD Pancreatic cyst Denies Paget's Dz, diabetes, CVD, blood clots, and chronic steroid use.   Sees DDS every 6 months. No major dental work planned.

## 2024-09-17 NOTE — REASON FOR VISIT
[Consultation] : a consultation visit [Osteoporosis] : osteoporosis [FreeTextEntry2] : Dr. Bertrand (Onc)

## 2024-09-18 LAB
ALBUMIN SERPL ELPH-MCNC: 4.4 G/DL
ALP BLD-CCNC: 116 U/L
ALT SERPL-CCNC: 18 U/L
ANION GAP SERPL CALC-SCNC: 15 MMOL/L
AST SERPL-CCNC: 22 U/L
BILIRUB SERPL-MCNC: 0.6 MG/DL
BUN SERPL-MCNC: 21 MG/DL
CALCIUM SERPL-MCNC: 9.3 MG/DL
CALCIUM SERPL-MCNC: 9.3 MG/DL
CHLORIDE SERPL-SCNC: 105 MMOL/L
CO2 SERPL-SCNC: 24 MMOL/L
CREAT SERPL-MCNC: 0.97 MG/DL
EGFR: 59 ML/MIN/1.73M2
GLUCOSE SERPL-MCNC: 112 MG/DL
MAGNESIUM SERPL-MCNC: 2.1 MG/DL
PARATHYROID HORMONE INTACT: 63 PG/ML
PHOSPHATE SERPL-MCNC: 3.2 MG/DL
POTASSIUM SERPL-SCNC: 4.5 MMOL/L
PROT SERPL-MCNC: 7.3 G/DL
SODIUM SERPL-SCNC: 144 MMOL/L

## 2024-09-25 LAB — COLLAGEN CTX SERPL-MCNC: 552 PG/ML

## 2024-10-15 ENCOUNTER — RESULT CHARGE (OUTPATIENT)
Age: 79
End: 2024-10-15

## 2024-10-16 ENCOUNTER — NON-APPOINTMENT (OUTPATIENT)
Age: 79
End: 2024-10-16

## 2024-10-16 ENCOUNTER — OUTPATIENT (OUTPATIENT)
Dept: OUTPATIENT SERVICES | Facility: HOSPITAL | Age: 79
LOS: 1 days | End: 2024-10-16
Payer: MEDICARE

## 2024-10-16 ENCOUNTER — APPOINTMENT (OUTPATIENT)
Dept: INTERNAL MEDICINE | Facility: CLINIC | Age: 79
End: 2024-10-16

## 2024-10-16 VITALS
WEIGHT: 173 LBS | OXYGEN SATURATION: 96 % | HEIGHT: 64 IN | SYSTOLIC BLOOD PRESSURE: 130 MMHG | HEART RATE: 94 BPM | BODY MASS INDEX: 29.53 KG/M2 | DIASTOLIC BLOOD PRESSURE: 68 MMHG

## 2024-10-16 DIAGNOSIS — N20.0 CALCULUS OF KIDNEY: Chronic | ICD-10-CM

## 2024-10-16 DIAGNOSIS — Z23 ENCOUNTER FOR IMMUNIZATION: ICD-10-CM

## 2024-10-16 DIAGNOSIS — M81.0 AGE-RELATED OSTEOPOROSIS W/OUT CURRENT PATHOLOGICAL FRACTURE: ICD-10-CM

## 2024-10-16 DIAGNOSIS — I10 ESSENTIAL (PRIMARY) HYPERTENSION: ICD-10-CM

## 2024-10-16 DIAGNOSIS — J43.9 EMPHYSEMA, UNSPECIFIED: ICD-10-CM

## 2024-10-16 DIAGNOSIS — Z00.00 ENCOUNTER FOR GENERAL ADULT MEDICAL EXAMINATION W/OUT ABNORMAL FINDINGS: ICD-10-CM

## 2024-10-16 DIAGNOSIS — Z98.89 OTHER SPECIFIED POSTPROCEDURAL STATES: Chronic | ICD-10-CM

## 2024-10-16 DIAGNOSIS — E78.5 HYPERLIPIDEMIA, UNSPECIFIED: ICD-10-CM

## 2024-10-16 DIAGNOSIS — Z98.890 OTHER SPECIFIED POSTPROCEDURAL STATES: Chronic | ICD-10-CM

## 2024-10-16 DIAGNOSIS — K86.2 CYST OF PANCREAS: ICD-10-CM

## 2024-10-16 DIAGNOSIS — D75.89 OTHER SPECIFIED DISEASES OF BLOOD AND BLOOD-FORMING ORGANS: ICD-10-CM

## 2024-10-16 PROCEDURE — G0444 DEPRESSION SCREEN ANNUAL: CPT | Mod: 59

## 2024-10-16 PROCEDURE — G0008: CPT

## 2024-10-16 PROCEDURE — 90662 IIV NO PRSV INCREASED AG IM: CPT

## 2024-10-16 PROCEDURE — 93010 ELECTROCARDIOGRAM REPORT: CPT

## 2024-10-16 PROCEDURE — G0442 ANNUAL ALCOHOL SCREEN 15 MIN: CPT | Mod: 59

## 2024-10-16 PROCEDURE — G0439: CPT

## 2024-10-20 LAB
CHOLEST SERPL-MCNC: 243 MG/DL
HCT VFR BLD CALC: 48.9 %
HDLC SERPL-MCNC: 79 MG/DL
HGB BLD-MCNC: 15.8 G/DL
LDLC SERPL CALC-MCNC: 134 MG/DL
MCHC RBC-ENTMCNC: 32.3 GM/DL
MCHC RBC-ENTMCNC: 33.7 PG
MCV RBC AUTO: 104.3 FL
NONHDLC SERPL-MCNC: 164 MG/DL
PLATELET # BLD AUTO: 241 K/UL
RBC # BLD: 4.69 M/UL
RBC # FLD: 12.6 %
TRIGL SERPL-MCNC: 172 MG/DL
TSH SERPL-ACNC: 0.79 UIU/ML
WBC # FLD AUTO: 5.37 K/UL

## 2024-10-29 DIAGNOSIS — Z23 ENCOUNTER FOR IMMUNIZATION: ICD-10-CM

## 2024-10-29 DIAGNOSIS — J43.9 EMPHYSEMA, UNSPECIFIED: ICD-10-CM

## 2024-10-29 DIAGNOSIS — E78.5 HYPERLIPIDEMIA, UNSPECIFIED: ICD-10-CM

## 2024-10-29 DIAGNOSIS — M81.0 AGE-RELATED OSTEOPOROSIS WITHOUT CURRENT PATHOLOGICAL FRACTURE: ICD-10-CM

## 2024-10-29 DIAGNOSIS — Z00.00 ENCOUNTER FOR GENERAL ADULT MEDICAL EXAMINATION WITHOUT ABNORMAL FINDINGS: ICD-10-CM

## 2024-10-29 DIAGNOSIS — Z13.39 ENCOUNTER FOR SCREENING EXAMINATION FOR OTHER MENTAL HEALTH AND BEHAVIORAL DISORDERS: ICD-10-CM

## 2024-10-29 DIAGNOSIS — K86.2 CYST OF PANCREAS: ICD-10-CM

## 2024-10-29 DIAGNOSIS — D75.89 OTHER SPECIFIED DISEASES OF BLOOD AND BLOOD-FORMING ORGANS: ICD-10-CM

## 2024-11-25 ENCOUNTER — NON-APPOINTMENT (OUTPATIENT)
Age: 79
End: 2024-11-25

## 2024-11-25 ENCOUNTER — RX RENEWAL (OUTPATIENT)
Age: 79
End: 2024-11-25

## 2024-12-11 ENCOUNTER — APPOINTMENT (OUTPATIENT)
Dept: CT IMAGING | Facility: CLINIC | Age: 79
End: 2024-12-11

## 2025-01-13 ENCOUNTER — APPOINTMENT (OUTPATIENT)
Dept: ENDOCRINOLOGY | Facility: CLINIC | Age: 80
End: 2025-01-13
Payer: MEDICARE

## 2025-01-13 VITALS
HEART RATE: 94 BPM | OXYGEN SATURATION: 96 % | SYSTOLIC BLOOD PRESSURE: 140 MMHG | DIASTOLIC BLOOD PRESSURE: 80 MMHG | WEIGHT: 168 LBS | HEIGHT: 64 IN | BODY MASS INDEX: 28.68 KG/M2

## 2025-01-13 DIAGNOSIS — E21.0 PRIMARY HYPERPARATHYROIDISM: ICD-10-CM

## 2025-01-13 DIAGNOSIS — M81.0 AGE-RELATED OSTEOPOROSIS W/OUT CURRENT PATHOLOGICAL FRACTURE: ICD-10-CM

## 2025-01-13 PROCEDURE — G2211 COMPLEX E/M VISIT ADD ON: CPT

## 2025-01-13 PROCEDURE — 99214 OFFICE O/P EST MOD 30 MIN: CPT

## 2025-01-14 LAB
ALBUMIN SERPL ELPH-MCNC: 4.3 G/DL
ALP BLD-CCNC: 117 U/L
ALT SERPL-CCNC: 15 U/L
ANION GAP SERPL CALC-SCNC: 13 MMOL/L
AST SERPL-CCNC: 21 U/L
BILIRUB SERPL-MCNC: 0.5 MG/DL
BUN SERPL-MCNC: 23 MG/DL
CALCIUM SERPL-MCNC: 9.5 MG/DL
CHLORIDE SERPL-SCNC: 105 MMOL/L
CO2 SERPL-SCNC: 26 MMOL/L
CREAT SERPL-MCNC: 0.93 MG/DL
EGFR: 63 ML/MIN/1.73M2
POTASSIUM SERPL-SCNC: 4.5 MMOL/L
PROT SERPL-MCNC: 7.2 G/DL
SODIUM SERPL-SCNC: 144 MMOL/L

## 2025-01-21 LAB — COLLAGEN CTX SERPL-MCNC: 264 PG/ML

## 2025-04-14 ENCOUNTER — APPOINTMENT (OUTPATIENT)
Dept: MRI IMAGING | Facility: CLINIC | Age: 80
End: 2025-04-14
Payer: MEDICARE

## 2025-04-14 PROCEDURE — A9585: CPT | Mod: JZ

## 2025-04-14 PROCEDURE — 74183 MRI ABD W/O CNTR FLWD CNTR: CPT

## 2025-05-01 ENCOUNTER — APPOINTMENT (OUTPATIENT)
Dept: SURGICAL ONCOLOGY | Facility: CLINIC | Age: 80
End: 2025-05-01
Payer: MEDICARE

## 2025-05-01 VITALS
HEIGHT: 64 IN | SYSTOLIC BLOOD PRESSURE: 129 MMHG | DIASTOLIC BLOOD PRESSURE: 79 MMHG | WEIGHT: 167 LBS | OXYGEN SATURATION: 96 % | BODY MASS INDEX: 28.51 KG/M2

## 2025-05-01 DIAGNOSIS — K86.2 CYST OF PANCREAS: ICD-10-CM

## 2025-05-01 PROCEDURE — 99213 OFFICE O/P EST LOW 20 MIN: CPT

## 2025-05-12 ENCOUNTER — NON-APPOINTMENT (OUTPATIENT)
Age: 80
End: 2025-05-12

## 2025-05-12 ENCOUNTER — APPOINTMENT (OUTPATIENT)
Dept: DERMATOLOGY | Facility: CLINIC | Age: 80
End: 2025-05-12
Payer: MEDICARE

## 2025-05-12 DIAGNOSIS — L81.4 OTHER MELANIN HYPERPIGMENTATION: ICD-10-CM

## 2025-05-12 DIAGNOSIS — Z12.83 ENCOUNTER FOR SCREENING FOR MALIGNANT NEOPLASM OF SKIN: ICD-10-CM

## 2025-05-12 DIAGNOSIS — L57.0 ACTINIC KERATOSIS: ICD-10-CM

## 2025-05-12 DIAGNOSIS — L82.1 OTHER SEBORRHEIC KERATOSIS: ICD-10-CM

## 2025-05-12 PROCEDURE — 99214 OFFICE O/P EST MOD 30 MIN: CPT

## 2025-05-12 RX ORDER — FLUOROURACIL 50 MG/G
5 CREAM TOPICAL
Qty: 1 | Refills: 1 | Status: ACTIVE | COMMUNITY
Start: 2025-05-12 | End: 1900-01-01

## 2025-08-05 ENCOUNTER — APPOINTMENT (OUTPATIENT)
Dept: HEMATOLOGY ONCOLOGY | Facility: CLINIC | Age: 80
End: 2025-08-05
Payer: MEDICARE

## 2025-08-05 VITALS
WEIGHT: 178.57 LBS | OXYGEN SATURATION: 97 % | HEART RATE: 93 BPM | BODY MASS INDEX: 30.65 KG/M2 | SYSTOLIC BLOOD PRESSURE: 139 MMHG | RESPIRATION RATE: 16 BRPM | DIASTOLIC BLOOD PRESSURE: 81 MMHG | TEMPERATURE: 97.7 F

## 2025-08-05 DIAGNOSIS — C50.919 MALIGNANT NEOPLASM OF UNSPECIFIED SITE OF UNSPECIFIED FEMALE BREAST: ICD-10-CM

## 2025-08-05 PROCEDURE — 99214 OFFICE O/P EST MOD 30 MIN: CPT

## 2025-08-28 ENCOUNTER — RX RENEWAL (OUTPATIENT)
Age: 80
End: 2025-08-28

## 2025-09-08 ENCOUNTER — APPOINTMENT (OUTPATIENT)
Dept: ULTRASOUND IMAGING | Facility: CLINIC | Age: 80
End: 2025-09-08
Payer: MEDICARE

## 2025-09-08 ENCOUNTER — RESULT REVIEW (OUTPATIENT)
Age: 80
End: 2025-09-08

## 2025-09-08 ENCOUNTER — APPOINTMENT (OUTPATIENT)
Dept: MAMMOGRAPHY | Facility: CLINIC | Age: 80
End: 2025-09-08
Payer: MEDICARE

## 2025-09-08 PROCEDURE — 76641 ULTRASOUND BREAST COMPLETE: CPT | Mod: 50

## 2025-09-08 PROCEDURE — 77067 SCR MAMMO BI INCL CAD: CPT

## 2025-09-08 PROCEDURE — 77063 BREAST TOMOSYNTHESIS BI: CPT
